# Patient Record
Sex: FEMALE | Race: WHITE | NOT HISPANIC OR LATINO | Employment: FULL TIME | URBAN - METROPOLITAN AREA
[De-identification: names, ages, dates, MRNs, and addresses within clinical notes are randomized per-mention and may not be internally consistent; named-entity substitution may affect disease eponyms.]

---

## 2017-01-16 ENCOUNTER — HOSPITAL ENCOUNTER (OUTPATIENT)
Dept: LAB | Facility: MEDICAL CENTER | Age: 22
End: 2017-01-16
Attending: PREVENTIVE MEDICINE
Payer: COMMERCIAL

## 2017-01-16 ENCOUNTER — EH NON-PROVIDER (OUTPATIENT)
Dept: OCCUPATIONAL MEDICINE | Facility: CLINIC | Age: 22
End: 2017-01-16

## 2017-01-16 ENCOUNTER — EMPLOYEE HEALTH (OUTPATIENT)
Dept: OCCUPATIONAL MEDICINE | Facility: CLINIC | Age: 22
End: 2017-01-16

## 2017-01-16 VITALS
WEIGHT: 195 LBS | TEMPERATURE: 98.8 F | HEIGHT: 66 IN | SYSTOLIC BLOOD PRESSURE: 124 MMHG | BODY MASS INDEX: 31.34 KG/M2 | HEART RATE: 92 BPM | DIASTOLIC BLOOD PRESSURE: 82 MMHG | OXYGEN SATURATION: 99 % | RESPIRATION RATE: 14 BRPM

## 2017-01-16 DIAGNOSIS — Z02.1 PHYSICAL EXAM, PRE-EMPLOYMENT: ICD-10-CM

## 2017-01-16 DIAGNOSIS — Z02.1 ENCOUNTER FOR PRE-EMPLOYMENT HEALTH SCREENING EXAMINATION: ICD-10-CM

## 2017-01-16 DIAGNOSIS — Z02.1 PRE-EMPLOYMENT DRUG SCREENING: Primary | ICD-10-CM

## 2017-01-16 LAB
AMP AMPHETAMINE: NORMAL
BAR BARBITURATES: NORMAL
BZO BENZODIAZEPINES: NORMAL
COC COCAINE: NORMAL
INT CON NEG: NORMAL
INT CON POS: NORMAL
MDMA ECSTASY: NORMAL
MET METHAMPHETAMINES: NORMAL
MTD METHADONE: NORMAL
OPI OPIATES: NORMAL
OXY OXYCODONE: NORMAL
PCP PHENCYCLIDINE: NORMAL
POC URINE DRUG SCREEN OCDRS: NORMAL
THC: NORMAL

## 2017-01-16 PROCEDURE — 94375 RESPIRATORY FLOW VOLUME LOOP: CPT | Performed by: PREVENTIVE MEDICINE

## 2017-01-16 PROCEDURE — 80305 DRUG TEST PRSMV DIR OPT OBS: CPT | Performed by: PREVENTIVE MEDICINE

## 2017-01-16 PROCEDURE — 99204 OFFICE O/P NEW MOD 45 MIN: CPT | Performed by: PREVENTIVE MEDICINE

## 2017-01-16 PROCEDURE — 86480 TB TEST CELL IMMUN MEASURE: CPT | Performed by: PREVENTIVE MEDICINE

## 2017-01-16 NOTE — MR AVS SNAPSHOT
"Dian Tinsley   2017 8:40 AM   Employee Health   MRN: [id number]    Department:  [facility]   Dept Phone:  [phone]    Description:  Female : 1995   Provider:  Kenneth Spann D.O.           Reason for Visit     Other [facility] physical      Allergies as of 2017     Allergen Noted Reactions    Gluten 2013   Diarrhea    Bloating/diarrhea    Lactose 2013   Diarrhea    Bloating/diarrhea    Pcn [Penicillins] 2009   Rash    rash    Sulfa Drugs 2009   Rash    rash      You were diagnosed with     Physical exam, pre-employment   [[id number]]         Vital Signs     Blood Pressure Pulse Temperature Respirations Height Weight    124/82 mmHg 92 37.1 °C (98.8 °F) 14 1.676 m (5' 5.98\") 88.451 kg (195 lb)    Body Mass Index Oxygen Saturation Smoking Status             31.49 kg/m2 99% Never Smoker          Basic Information     Date Of Birth Sex Race Ethnicity Preferred Language    1995 Female White Non- English      Problem List              ICD-10-CM Priority Class Noted - Resolved    Hypertrophy of tonsils with hypertrophy of adenoids J35.3   2013 - Present      Health Maintenance        Date Due Completion Dates    IMM HEP A VACCINE (1 of 2 - Standard Series) 1996 ---    IMM HPV VACCINE (1 of 3 - Female 3 Dose Series) 2006 ---    IMM VARICELLA (CHICKENPOX) VACCINE (1 of 2 - 2 Dose Adolescent Series) 2008 ---    IMM MENINGOCOCCAL VACCINE (MCV4) (1 of 1) 2011 ---    PAP SMEAR 2016 ---    IMM INFLUENZA (1) 2016 ---    IMM DTaP/Tdap/Td Vaccine (6 - Td) 2020, 1997, 4/15/1996, 1995, 1995            Current Immunizations     Dtap Vaccine 1997, 4/15/1996, 1995, 1995    HIB Vaccine (ACTHIB/HIBERIX) 1997, 4/15/1996, 1995, 1995    Hepatitis B Vaccine Non-Recombivax (Ped/Adol) 4/15/1996, 1995, 1995    IPV 4/15/1996, 1995, 1995    MMR Vaccine " 1/27/1997    Tdap Vaccine 2/1/2010    Tuberculin Skin Test 3/30/2016, 4/8/2015  3:01 PM, 4/25/2014  5:37 AM, 6/7/2013  4:10 PM, 5/29/2013  2:38 PM, 4/20/2012  4:00 PM, 4/13/2012  4:15 PM      Below and/or attached are the medications your provider expects you to take. Review all of your home medications and newly ordered medications with your provider and/or pharmacist. Follow medication instructions as directed by your provider and/or pharmacist. Please keep your medication list with you and share with your provider. Update the information when medications are discontinued, doses are changed, or new medications (including over-the-counter products) are added; and carry medication information at all times in the event of emergency situations     Allergies:  GLUTEN - Diarrhea     LACTOSE - Diarrhea     PCN - Rash     SULFA DRUGS - Rash               Medications  Valid as of: January 16, 2017 - 10:07 AM    Generic Name Brand Name Tablet Size Instructions for use    Azithromycin (Recon Susp) ZITHROMAX 200 MG/5ML 10CC  TODAY, THEN 5 CC ONCE A DAY FOR 4 DAYS        Ibuprofen (Tab) MOTRIN 200 MG Take 200 mg by mouth every 6 hours as needed. Indications: Mild to Moderate Pain        Oxycodone-Acetaminophen (Solution) ROXICET 5-325 MG/5ML Take 5-7.5 mL by mouth every four hours as needed (pain).        .                 Medicines prescribed today were sent to:     [id number] - [place], [place] - [address]    [address] [facility] [address]    Phone: [phone] Fax: [phone]    Open 24 Hours?: No      Medication refill instructions:       If your prescription bottle indicates you have medication refills left, it is not necessary to call your provider’s office. Please contact your pharmacy and they will refill your medication.    If your prescription bottle indicates you do not have any refills left, you may request refills at any time through one of the following ways: The online [employer] system (except Urgent Care), by  calling your provider’s office, or by asking your pharmacy to contact your provider’s office with a refill request. Medication refills are processed only during regular business hours and may not be available until the next business day. Your provider may request additional information or to have a follow-up visit with you prior to refilling your medication.   *Please Note: Medication refills are assigned a new Rx number when refilled electronically. Your pharmacy may indicate that no refills were authorized even though a new prescription for the same medication is available at the pharmacy. Please request the medicine by name with the pharmacy before contacting your provider for a refill.           ActionBase Access Code: [email or web address]  Expires: 2/15/2017  7:54 AM    ActionBase  A secure, online tool to manage your health information     [employer]’s ActionBase® is a secure, online tool that connects you to your personalized health information from the privacy of your home -- day or night - making it very easy for you to manage your healthcare. Once the activation process is completed, you can even access your medical information using the ActionBase goran, which is available for free in the Apple Goran store or Google Play store.     ActionBase provides the following levels of access (as shown below):   My Chart Features   [facility] Doctor [facility]  Specialists [facility]  Urgent  Care [facility]  Primary Care  Doctor   Email your healthcare team securely and privately 24/7 X X X    Manage appointments: schedule your next appointment; view details of past/upcoming appointments X      Request prescription refills. X      View recent personal medical records, including lab and immunizations X X X X   View health record, including health history, allergies, medications X X X X   Read reports about your outpatient visits, procedures, consult and ER notes X X X X   See your discharge summary, which is a recap of your hospital  and/or ER visit that includes your diagnosis, lab results, and care plan. X X       How to register for NetManage:  1. Go to  [email or web address].  2. Click on the Sign Up Now box, which takes you to the New Member Sign Up page. You will need to provide the following information:  a. Enter your NetManage Access Code exactly as it appears at the top of this page. (You will not need to use this code after you’ve completed the sign-up process. If you do not sign up before the expiration date, you must request a new code.)   b. Enter your date of birth.   c. Enter your home email address.   d. Click Submit, and follow the next screen’s instructions.  3. Create a CSIDt ID. This will be your NetManage login ID and cannot be changed, so think of one that is secure and easy to remember.  4. Create a CSIDt password. You can change your password at any time.  5. Enter your Password Reset Question and Answer. This can be used at a later time if you forget your password.   6. Enter your e-mail address. This allows you to receive e-mail notifications when new information is available in NetManage.  7. Click Sign Up. You can now view your health information.    For assistance activating your NetManage account, call [phone]

## 2017-01-18 LAB
M TB TUBERC IFN-G/MITOGEN IGNF BLD: 0.04
M TB TUBERC IGNF/MITOGEN IGNF CONTROL: 66.85 [IU]/ML
MITOGEN IGNF BCKGRD COR BLD-ACNC: 0.05 [IU]/ML
QUANT TB GOLD 86480: NEGATIVE

## 2017-09-20 ENCOUNTER — HOSPITAL ENCOUNTER (OUTPATIENT)
Dept: LAB | Facility: MEDICAL CENTER | Age: 22
End: 2017-09-20
Payer: COMMERCIAL

## 2017-09-20 LAB
BDY FAT % MEASURED: 32.3 %
BP DIAS: 98 MMHG
BP SYS: 139 MMHG
CHOLEST SERPL-MCNC: 175 MG/DL (ref 100–199)
DIABETES HTDIA: NO
EVENT NAME HTEVT: NORMAL
FASTING HTFAS: YES
GLUCOSE SERPL-MCNC: 80 MG/DL (ref 65–99)
HDLC SERPL-MCNC: 51 MG/DL
HYPERTENSION HTHYP: NO
LDLC SERPL CALC-MCNC: 109 MG/DL
SCREENING LOC CITY HTCIT: NORMAL
SCREENING LOC STATE HTSTA: NORMAL
SCREENING LOCATION HTLOC: NORMAL
SMOKING HTSMO: NO
SUBSCRIBER ID HTSID: NORMAL
TRIGL SERPL-MCNC: 77 MG/DL (ref 0–149)

## 2017-09-20 PROCEDURE — 80061 LIPID PANEL: CPT

## 2017-09-20 PROCEDURE — S5190 WELLNESS ASSESSMENT BY NONPH: HCPCS

## 2017-09-20 PROCEDURE — 82947 ASSAY GLUCOSE BLOOD QUANT: CPT

## 2017-09-20 PROCEDURE — 36415 COLL VENOUS BLD VENIPUNCTURE: CPT

## 2017-09-27 ENCOUNTER — EH NON-PROVIDER (OUTPATIENT)
Dept: OCCUPATIONAL MEDICINE | Facility: CLINIC | Age: 22
End: 2017-09-27

## 2017-09-27 DIAGNOSIS — Z29.89 NEED FOR ISOLATION: ICD-10-CM

## 2017-09-27 PROCEDURE — 94375 RESPIRATORY FLOW VOLUME LOOP: CPT

## 2017-11-09 ENCOUNTER — IMMUNIZATION (OUTPATIENT)
Dept: OCCUPATIONAL MEDICINE | Facility: CLINIC | Age: 22
End: 2017-11-09

## 2017-11-09 DIAGNOSIS — Z23 NEED FOR VACCINATION: ICD-10-CM

## 2017-11-09 PROCEDURE — 90686 IIV4 VACC NO PRSV 0.5 ML IM: CPT | Performed by: PREVENTIVE MEDICINE

## 2018-08-27 ENCOUNTER — DOCUMENTATION (OUTPATIENT)
Dept: OCCUPATIONAL MEDICINE | Facility: CLINIC | Age: 23
End: 2018-08-27

## 2018-09-14 ENCOUNTER — OFFICE VISIT (OUTPATIENT)
Dept: MEDICAL GROUP | Facility: MEDICAL CENTER | Age: 23
End: 2018-09-14
Payer: COMMERCIAL

## 2018-09-14 VITALS
OXYGEN SATURATION: 98 % | TEMPERATURE: 98.6 F | HEIGHT: 65 IN | WEIGHT: 216 LBS | DIASTOLIC BLOOD PRESSURE: 80 MMHG | RESPIRATION RATE: 16 BRPM | HEART RATE: 83 BPM | BODY MASS INDEX: 35.99 KG/M2 | SYSTOLIC BLOOD PRESSURE: 140 MMHG

## 2018-09-14 DIAGNOSIS — R53.83 FATIGUE, UNSPECIFIED TYPE: ICD-10-CM

## 2018-09-14 DIAGNOSIS — K90.41 GLUTEN INTOLERANCE: ICD-10-CM

## 2018-09-14 DIAGNOSIS — Z13.220 SCREENING CHOLESTEROL LEVEL: ICD-10-CM

## 2018-09-14 DIAGNOSIS — N92.6 IRREGULAR MENSES: ICD-10-CM

## 2018-09-14 DIAGNOSIS — R19.8 ALTERNATING CONSTIPATION AND DIARRHEA: ICD-10-CM

## 2018-09-14 DIAGNOSIS — E66.9 OBESITY (BMI 35.0-39.9 WITHOUT COMORBIDITY): ICD-10-CM

## 2018-09-14 DIAGNOSIS — R10.30 LOWER ABDOMINAL PAIN: ICD-10-CM

## 2018-09-14 PROBLEM — E74.39 GLUCOSE INTOLERANCE: Status: ACTIVE | Noted: 2018-09-14

## 2018-09-14 PROBLEM — E74.39 GLUCOSE INTOLERANCE: Status: RESOLVED | Noted: 2018-09-14 | Resolved: 2018-09-14

## 2018-09-14 PROCEDURE — 99203 OFFICE O/P NEW LOW 30 MIN: CPT | Performed by: NURSE PRACTITIONER

## 2018-09-14 ASSESSMENT — PATIENT HEALTH QUESTIONNAIRE - PHQ9: CLINICAL INTERPRETATION OF PHQ2 SCORE: 0

## 2018-09-14 ASSESSMENT — ENCOUNTER SYMPTOMS
CONSTIPATION: 1
ABDOMINAL PAIN: 1
DIARRHEA: 1

## 2018-09-14 NOTE — PROGRESS NOTES
Subjective:      Dian Tinsley is a 23 y.o. 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 She states she has not been to a medical provider in a few years.    HPI Dian Tinsley        1. Alternating constipation and diarrhea  Patient reports her previous PCP did some testing and found she was gluten intolerant but not with celiac disease. She states she avoids gluten and tries to watch her diet including avoidance of milk products because she is also lactose intolerant. She continues to have issues with her bowels despite this. She states she may go a few days with constipation and then have a few days of diarrhea. She has never had irritable bowel workup.    2. Gluten intolerance  As mentioned above, patient states she cannot eat gluten or milk products.    3. Fatigue, unspecified type  Patient reports she has had some issues with fatigue over the last few months and wonders if she may have some sort of hormonal imbalance. She would like to be tested for thyroid and does not feel she has sleep apnea or depression.    4. Irregular menses  It appears that patient had been having problems with her menstrual period for a few years and was on birth control pills in the past but did not tolerate them well. She also has obesity but denies hirsutism or excessive acne. She states she is a virgin.    5. Lower abdominal pain  Patient states she has mild lower abdominal discomfort when she has bloating and constipation but otherwise does not have any issues.    6. Obesity (BMI 35.0-39.9 without comorbidity)  Patient has issues with losing weight.      Social History   Substance Use Topics   • Smoking status: Never Smoker   • Smokeless tobacco: Never Used   • Alcohol use Yes      Comment: occasa     No current outpatient prescriptions  "on file.     No current facility-administered medications for this visit.      Past Medical History:   Diagnosis Date   • Bronchitis 2005   • Other specified disorder of intestines     Secondary to Gluten allergy     Family History   Problem Relation Age of Onset   • No Known Problems Mother    • Kidney Disease Father    • Arthritis Father    • Alcohol/Drug Father        Review of Systems   Constitutional: Positive for malaise/fatigue.   Gastrointestinal: Positive for abdominal pain, constipation and diarrhea.   All other systems reviewed and are negative.         Objective:     /80   Pulse 83   Temp 37 °C (98.6 °F)   Resp 16   Ht 1.651 m (5' 5\")   Wt 98 kg (216 lb)   SpO2 98%   BMI 35.94 kg/m²      Physical Exam   Constitutional: She is oriented to person, place, and time. She appears well-developed and well-nourished. No distress.   HENT:   Head: Normocephalic and atraumatic.   Right Ear: External ear normal.   Left Ear: External ear normal.   Nose: Nose normal.   Eyes: Right eye exhibits no discharge. Left eye exhibits no discharge.   Neck: Normal range of motion. Neck supple. No thyromegaly present.   Cardiovascular: Normal rate, regular rhythm and normal heart sounds.  Exam reveals no gallop and no friction rub.    No murmur heard.  Pulmonary/Chest: Effort normal and breath sounds normal. She has no wheezes. She has no rales.   Abdominal: Soft. Bowel sounds are normal. She exhibits no distension and no mass. There is no tenderness. There is no rebound and no guarding.   Musculoskeletal: She exhibits no edema or tenderness.   Neurological: She is alert and oriented to person, place, and time. She displays normal reflexes.   Skin: Skin is warm and dry. No rash noted. She is not diaphoretic.   Psychiatric: She has a normal mood and affect. Her behavior is normal. Judgment and thought content normal.   Nursing note and vitals reviewed.              Assessment/Plan:     1. Alternating constipation and " diarrhea  Patient's symptoms sound suspicious for possible irritable bowel so I will refer her to gastroenterology for further workup.   - REFERRAL TO GASTROENTEROLOGY    2. Gluten intolerance  Patient states she tries to avoid gluten products because she was told she had a gluten intolerance as well as lactose intolerance.    3. Fatigue, unspecified type  I will check for underlying issues such as diabetes, thyroid disease and anemia. She does not feel she has sleep apnea or depression.  - COMP METABOLIC PANEL; Future  - TSH; Future  - CBC WITHOUT DIFFERENTIAL; Future    4. Irregular menses  Patient reports problems with irregular menses and she has some hormonal issues so she will be referred to gynecology. She states she has never been sexually active.  - REFERRAL TO GYNECOLOGY    5. Lower abdominal pain  I question possible polycystic ovarian disease with her weight and her regular periods. I will check her thyroid as well as for diabetes.  - US-GYN-PELVIS TRANSVAGINAL; Future    6. Obesity (BMI 35.0-39.9 without comorbidity)    - Patient identified as having weight management issue.  Appropriate orders and counseling given.    7. Influenza vaccine needed  Patient left before receiving vaccine.      8. Screening cholesterol level    - LIPID PROFILE; Future

## 2018-09-18 ENCOUNTER — HOSPITAL ENCOUNTER (OUTPATIENT)
Dept: LAB | Facility: MEDICAL CENTER | Age: 23
End: 2018-09-18
Attending: NURSE PRACTITIONER
Payer: COMMERCIAL

## 2018-09-18 ENCOUNTER — HOSPITAL ENCOUNTER (OUTPATIENT)
Dept: RADIOLOGY | Facility: MEDICAL CENTER | Age: 23
End: 2018-09-18
Attending: NURSE PRACTITIONER
Payer: COMMERCIAL

## 2018-09-18 ENCOUNTER — HOSPITAL ENCOUNTER (OUTPATIENT)
Dept: LAB | Facility: MEDICAL CENTER | Age: 23
End: 2018-09-18
Payer: COMMERCIAL

## 2018-09-18 DIAGNOSIS — Z13.220 SCREENING CHOLESTEROL LEVEL: ICD-10-CM

## 2018-09-18 DIAGNOSIS — R10.30 LOWER ABDOMINAL PAIN: ICD-10-CM

## 2018-09-18 DIAGNOSIS — R53.83 FATIGUE, UNSPECIFIED TYPE: ICD-10-CM

## 2018-09-18 LAB
ALBUMIN SERPL BCP-MCNC: 4.6 G/DL (ref 3.2–4.9)
ALBUMIN/GLOB SERPL: 1.6 G/DL
ALP SERPL-CCNC: 80 U/L (ref 30–99)
ALT SERPL-CCNC: 13 U/L (ref 2–50)
ANION GAP SERPL CALC-SCNC: 6 MMOL/L (ref 0–11.9)
AST SERPL-CCNC: 17 U/L (ref 12–45)
BDY FAT % MEASURED: 36.9 %
BILIRUB SERPL-MCNC: 0.7 MG/DL (ref 0.1–1.5)
BP DIAS: 66 MMHG
BP SYS: 136 MMHG
BUN SERPL-MCNC: 11 MG/DL (ref 8–22)
CALCIUM SERPL-MCNC: 9.9 MG/DL (ref 8.5–10.5)
CHLORIDE SERPL-SCNC: 105 MMOL/L (ref 96–112)
CHOLEST SERPL-MCNC: 164 MG/DL (ref 100–199)
CHOLEST SERPL-MCNC: 166 MG/DL (ref 100–199)
CO2 SERPL-SCNC: 28 MMOL/L (ref 20–33)
CREAT SERPL-MCNC: 0.73 MG/DL (ref 0.5–1.4)
DIABETES HTDIA: NO
ERYTHROCYTE [DISTWIDTH] IN BLOOD BY AUTOMATED COUNT: 37.9 FL (ref 35.9–50)
EVENT NAME HTEVT: NORMAL
FASTING HTFAS: YES
FASTING STATUS PATIENT QL REPORTED: NORMAL
GLOBULIN SER CALC-MCNC: 2.8 G/DL (ref 1.9–3.5)
GLUCOSE SERPL-MCNC: 84 MG/DL (ref 65–99)
GLUCOSE SERPL-MCNC: 84 MG/DL (ref 65–99)
HCT VFR BLD AUTO: 38.6 % (ref 37–47)
HDLC SERPL-MCNC: 43 MG/DL
HDLC SERPL-MCNC: 44 MG/DL
HGB BLD-MCNC: 12.4 G/DL (ref 12–16)
HYPERTENSION HTHYP: NO
LDLC SERPL CALC-MCNC: 102 MG/DL
LDLC SERPL CALC-MCNC: 104 MG/DL
MCH RBC QN AUTO: 26.8 PG (ref 27–33)
MCHC RBC AUTO-ENTMCNC: 32.1 G/DL (ref 33.6–35)
MCV RBC AUTO: 83.4 FL (ref 81.4–97.8)
PLATELET # BLD AUTO: 348 K/UL (ref 164–446)
PMV BLD AUTO: 10.7 FL (ref 9–12.9)
POTASSIUM SERPL-SCNC: 4.1 MMOL/L (ref 3.6–5.5)
PROT SERPL-MCNC: 7.4 G/DL (ref 6–8.2)
RBC # BLD AUTO: 4.63 M/UL (ref 4.2–5.4)
SCREENING LOC CITY HTCIT: NORMAL
SCREENING LOC STATE HTSTA: NORMAL
SCREENING LOCATION HTLOC: NORMAL
SMOKING HTSMO: NO
SODIUM SERPL-SCNC: 139 MMOL/L (ref 135–145)
SUBSCRIBER ID HTSID: NORMAL
TRIGL SERPL-MCNC: 92 MG/DL (ref 0–149)
TRIGL SERPL-MCNC: 94 MG/DL (ref 0–149)
TSH SERPL DL<=0.005 MIU/L-ACNC: 2.37 UIU/ML (ref 0.38–5.33)
WBC # BLD AUTO: 7 K/UL (ref 4.8–10.8)

## 2018-09-18 PROCEDURE — 80053 COMPREHEN METABOLIC PANEL: CPT

## 2018-09-18 PROCEDURE — 36415 COLL VENOUS BLD VENIPUNCTURE: CPT

## 2018-09-18 PROCEDURE — 80061 LIPID PANEL: CPT | Mod: 91

## 2018-09-18 PROCEDURE — 84443 ASSAY THYROID STIM HORMONE: CPT

## 2018-09-18 PROCEDURE — 82947 ASSAY GLUCOSE BLOOD QUANT: CPT

## 2018-09-18 PROCEDURE — 76830 TRANSVAGINAL US NON-OB: CPT

## 2018-09-18 PROCEDURE — S5190 WELLNESS ASSESSMENT BY NONPH: HCPCS

## 2018-09-18 PROCEDURE — 85027 COMPLETE CBC AUTOMATED: CPT

## 2018-09-18 PROCEDURE — 80061 LIPID PANEL: CPT

## 2018-09-21 ENCOUNTER — EH NON-PROVIDER (OUTPATIENT)
Dept: OCCUPATIONAL MEDICINE | Facility: CLINIC | Age: 23
End: 2018-09-21

## 2018-09-21 DIAGNOSIS — Z02.89 ENCOUNTER FOR OCCUPATIONAL HEALTH EXAMINATION INVOLVING RESPIRATOR: Primary | ICD-10-CM

## 2018-09-21 PROCEDURE — 94375 RESPIRATORY FLOW VOLUME LOOP: CPT | Performed by: PREVENTIVE MEDICINE

## 2018-09-26 ENCOUNTER — IMMUNIZATION (OUTPATIENT)
Dept: OCCUPATIONAL MEDICINE | Facility: CLINIC | Age: 23
End: 2018-09-26

## 2018-09-26 DIAGNOSIS — Z23 NEED FOR VACCINATION: ICD-10-CM

## 2018-10-05 PROCEDURE — 90686 IIV4 VACC NO PRSV 0.5 ML IM: CPT | Performed by: PREVENTIVE MEDICINE

## 2019-01-12 ENCOUNTER — OFFICE VISIT (OUTPATIENT)
Dept: URGENT CARE | Facility: MEDICAL CENTER | Age: 24
End: 2019-01-12
Payer: COMMERCIAL

## 2019-01-12 VITALS
OXYGEN SATURATION: 95 % | SYSTOLIC BLOOD PRESSURE: 130 MMHG | TEMPERATURE: 98.7 F | HEART RATE: 95 BPM | DIASTOLIC BLOOD PRESSURE: 86 MMHG | HEIGHT: 65 IN | BODY MASS INDEX: 36.89 KG/M2 | WEIGHT: 221.4 LBS

## 2019-01-12 DIAGNOSIS — R11.10 VOMITING, INTRACTABILITY OF VOMITING NOT SPECIFIED, PRESENCE OF NAUSEA NOT SPECIFIED, UNSPECIFIED VOMITING TYPE: ICD-10-CM

## 2019-01-12 PROCEDURE — 99214 OFFICE O/P EST MOD 30 MIN: CPT | Performed by: NURSE PRACTITIONER

## 2019-01-12 RX ORDER — ONDANSETRON 2 MG/ML
4 INJECTION INTRAMUSCULAR; INTRAVENOUS ONCE
Status: COMPLETED | OUTPATIENT
Start: 2019-01-12 | End: 2019-01-12

## 2019-01-12 RX ORDER — ONDANSETRON 4 MG/1
4 TABLET, FILM COATED ORAL EVERY 4 HOURS PRN
Qty: 20 TAB | Refills: 0 | Status: SHIPPED | OUTPATIENT
Start: 2019-01-12 | End: 2019-04-21

## 2019-01-12 RX ADMIN — ONDANSETRON 4 MG: 2 INJECTION INTRAMUSCULAR; INTRAVENOUS at 17:01

## 2019-01-12 ASSESSMENT — ENCOUNTER SYMPTOMS
VOMITING: 1
FEVER: 0
CHILLS: 0
ABDOMINAL PAIN: 0
NUMBER OF EPISODES OF EMESIS TODAY: 1
DIARRHEA: 0
NAUSEA: 1

## 2019-01-13 NOTE — PROGRESS NOTES
"Subjective:      Dian Tinsley is a 23 y.o. female who presents with Emesis (fever/ chills/ nausea X1 day)     Past Medical History:   Diagnosis Date   • Bronchitis 2005   • Other specified disorder of intestines     Secondary to Gluten allergy     Social History     Social History   • Marital status: Single     Spouse name: N/A   • Number of children: N/A   • Years of education: N/A     Occupational History   • Not on file.     Social History Main Topics   • Smoking status: Never Smoker   • Smokeless tobacco: Never Used   • Alcohol use No      Comment: occasa   • Drug use: No   • Sexual activity: No     Other Topics Concern   • Not on file     Social History Narrative   • No narrative on file     Family History   Problem Relation Age of Onset   • No Known Problems Mother    • Kidney Disease Father    • Arthritis Father    • Alcohol/Drug Father        Allergies: Gluten; Lactose; Pcn [penicillins]; Sulfa drugs; and Latex    Patient is a 23-year-old female who presents today with complaint of nausea and vomiting.  Symptoms started yesterday.  She denies any abdominal pain acutely or diarrhea.  She has had general malaise but no known fever.            Emesis   This is a new problem. The current episode started yesterday. The problem occurs intermittently. The problem has been unchanged. Associated symptoms include nausea and vomiting. Pertinent negatives include no abdominal pain, chills or fever. Associated symptoms comments: No diarrhea. Nothing aggravates the symptoms. She has tried nothing for the symptoms. The treatment provided no relief.       Review of Systems   Constitutional: Positive for malaise/fatigue. Negative for chills and fever.   Gastrointestinal: Positive for nausea and vomiting. Negative for abdominal pain and diarrhea.   All other systems reviewed and are negative.         Objective:     /86   Pulse 95   Temp 37.1 °C (98.7 °F) (Temporal)   Ht 1.651 m (5' 5\")   Wt 100.4 kg (221 lb " 6.4 oz)   LMP 01/09/2019   SpO2 95%   BMI 36.84 kg/m²      Physical Exam   Constitutional: She is oriented to person, place, and time. She appears well-developed and well-nourished.   Eyes: Pupils are equal, round, and reactive to light. Conjunctivae and EOM are normal.   Neck: Normal range of motion. Neck supple.   Cardiovascular: Normal rate and regular rhythm.    Pulmonary/Chest: Effort normal and breath sounds normal.   Abdominal: She exhibits no distension. There is tenderness. There is no guarding.   Mild generalized tenderness without guarding or rebound tenderness.   Musculoskeletal: Normal range of motion.   Neurological: She is alert and oriented to person, place, and time.   Skin: Skin is warm and dry. Capillary refill takes less than 2 seconds.   Psychiatric: She has a normal mood and affect. Her behavior is normal. Judgment and thought content normal.   Vitals reviewed.    Patient states that she has never been sexually active and is currently on her period.          Assessment/Plan:     1. Vomiting, intractability of vomiting not specified, presence of nausea not specified, unspecified vomiting type    - ondansetron (ZOFRAN) syringe/vial injection 4 mg; 2 mL by Intramuscular route Once.  - ondansetron (ZOFRAN) 4 MG Tab tablet; Take 1 Tab by mouth every four hours as needed for Nausea/Vomiting.  Dispense: 20 Tab; Refill: 0

## 2019-01-21 ENCOUNTER — OFFICE VISIT (OUTPATIENT)
Dept: URGENT CARE | Facility: CLINIC | Age: 24
End: 2019-01-21
Payer: COMMERCIAL

## 2019-01-21 VITALS
TEMPERATURE: 97.9 F | DIASTOLIC BLOOD PRESSURE: 90 MMHG | HEIGHT: 66 IN | WEIGHT: 215 LBS | RESPIRATION RATE: 17 BRPM | HEART RATE: 79 BPM | OXYGEN SATURATION: 97 % | BODY MASS INDEX: 34.55 KG/M2 | SYSTOLIC BLOOD PRESSURE: 130 MMHG

## 2019-01-21 DIAGNOSIS — R11.2 NAUSEA AND VOMITING, INTRACTABILITY OF VOMITING NOT SPECIFIED, UNSPECIFIED VOMITING TYPE: ICD-10-CM

## 2019-01-21 LAB
APPEARANCE UR: CLEAR
BILIRUB UR STRIP-MCNC: NEGATIVE MG/DL
COLOR UR AUTO: YELLOW
FLUAV+FLUBV AG SPEC QL IA: NEGATIVE
GLUCOSE UR STRIP.AUTO-MCNC: NEGATIVE MG/DL
INT CON NEG: NEGATIVE
INT CON NEG: NORMAL
INT CON POS: NORMAL
INT CON POS: POSITIVE
KETONES UR STRIP.AUTO-MCNC: NEGATIVE MG/DL
LEUKOCYTE ESTERASE UR QL STRIP.AUTO: NORMAL
NITRITE UR QL STRIP.AUTO: NEGATIVE
PH UR STRIP.AUTO: 7.5 [PH] (ref 5–8)
POC URINE PREGNANCY TEST: NEGATIVE
PROT UR QL STRIP: NEGATIVE MG/DL
RBC UR QL AUTO: NEGATIVE
SP GR UR STRIP.AUTO: 1.02
UROBILINOGEN UR STRIP-MCNC: 1 MG/DL

## 2019-01-21 PROCEDURE — 81025 URINE PREGNANCY TEST: CPT | Performed by: PHYSICIAN ASSISTANT

## 2019-01-21 PROCEDURE — 99214 OFFICE O/P EST MOD 30 MIN: CPT | Performed by: PHYSICIAN ASSISTANT

## 2019-01-21 PROCEDURE — 81002 URINALYSIS NONAUTO W/O SCOPE: CPT | Performed by: PHYSICIAN ASSISTANT

## 2019-01-21 PROCEDURE — 87804 INFLUENZA ASSAY W/OPTIC: CPT | Performed by: PHYSICIAN ASSISTANT

## 2019-01-21 RX ORDER — PROMETHAZINE HYDROCHLORIDE 25 MG/1
25 TABLET ORAL EVERY 6 HOURS PRN
Qty: 30 TAB | Refills: 0 | Status: SHIPPED | OUTPATIENT
Start: 2019-01-21 | End: 2019-04-21

## 2019-01-23 ASSESSMENT — ENCOUNTER SYMPTOMS
NUMBER OF EPISODES OF EMESIS TODAY: 1
FATIGUE: 0
SHORTNESS OF BREATH: 0
FEVER: 0
COUGH: 0
BLOOD IN STOOL: 0
DIARRHEA: 0
NAUSEA: 1
MUSCULOSKELETAL NEGATIVE: 1
FLANK PAIN: 0
ABDOMINAL PAIN: 0
DIZZINESS: 0
CONSTIPATION: 0
CHILLS: 0
VOMITING: 1

## 2019-01-23 NOTE — PROGRESS NOTES
"Subjective:      Dian Tinsley is a 23 y.o. female who presents with Emesis (nausea, taking zofran po without relief, not working, missing work, constant nausea, dizzy, light head) and Letter for School/Work            Emesis   This is a new problem. The current episode started 1 to 4 weeks ago (9 days). The problem occurs intermittently. The problem has been waxing and waning. Associated symptoms include nausea and vomiting. Pertinent negatives include no abdominal pain, chest pain, chills, congestion, coughing, fatigue, fever or rash. The symptoms are aggravated by eating and drinking. Treatments tried: zofran. The treatment provided mild relief.     Patient was seen in urgent care 9 days ago for the same problem. She reports intermittent nausea and vomiting since that time. Symptoms have improved but haven't resolved. She has been taking zofran without significant relief. No fevers, chills, body aches, abdominal pain, constipation, diarrhea, or urinary symptoms. She did recently return from a trip to [place], but denies any concern for suspect food intake. None of her companions are currently experiencing similar symptoms.     Review of Systems   Constitutional: Negative for chills, fatigue and fever.   HENT: Negative for congestion.    Respiratory: Negative for cough and shortness of breath.    Cardiovascular: Negative for chest pain.   Gastrointestinal: Positive for nausea and vomiting. Negative for abdominal pain, blood in stool, constipation and diarrhea.   Genitourinary: Negative for dysuria, flank pain, frequency, hematuria and urgency.   Musculoskeletal: Negative.    Skin: Negative for rash.   Neurological: Negative for dizziness.        Objective:     /90 (BP Location: Left arm, Patient Position: Sitting, BP Cuff Size: Adult)   Pulse 79   Temp 36.6 °C (97.9 °F) (Temporal)   Resp 17   Ht 1.676 m (5' 6\")   Wt 97.5 kg (215 lb)   LMP 01/09/2019   SpO2 97%   BMI 34.70 kg/m²      Physical " Exam   Constitutional: She is oriented to person, place, and time. She appears well-developed and well-nourished. No distress.   HENT:   Head: Normocephalic and atraumatic.   Eyes: Pupils are equal, round, and reactive to light.   Neck: Normal range of motion.   Cardiovascular: Normal rate.    Pulmonary/Chest: Effort normal.   Abdominal: Soft. Bowel sounds are normal. She exhibits no distension. There is no tenderness. There is no guarding.   No CVAT bilaterally   Musculoskeletal: Normal range of motion.   Neurological: She is alert and oriented to person, place, and time.   Skin: Skin is warm and dry. She is not diaphoretic.   Psychiatric: She has a normal mood and affect. Her behavior is normal.   Nursing note and vitals reviewed.         PMH:  has a past medical history of Bronchitis (2005) and Other specified disorder of intestines. She also has no past medical history of Pain.  MEDS:   Current Outpatient Prescriptions:   •  promethazine (PHENERGAN) 25 MG Tab, Take 1 Tab by mouth every 6 hours as needed for Nausea/Vomiting., Disp: 30 Tab, Rfl: 0  •  ondansetron (ZOFRAN) 4 MG Tab tablet, Take 1 Tab by mouth every four hours as needed for Nausea/Vomiting., Disp: 20 Tab, Rfl: 0  ALLERGIES:   Allergies   Allergen Reactions   • Gluten Diarrhea     Bloating/diarrhea   • Lactose Diarrhea     Bloating/diarrhea   • Pcn [Penicillins] Rash     rash   • Sulfa Drugs Rash     rash   • Latex      SURGHX:   Past Surgical History:   Procedure Laterality Date   • TONSILLECTOMY  12/13/2013    Performed by Jose Mackey M.D. at SURGERY [facility]   • OTHER  2012    wisdom teeth extraction   • OTHER  1997    Sinus opening from ear into chest-repaired     SOCHX:  reports that she has never smoked. She has never used smokeless tobacco. She reports that she does not drink alcohol or use drugs.  FH: family history includes Alcohol/Drug in her father; Arthritis in her father; Kidney Disease in her father; No Known Problems in her  mother.     POCT Urinalysis:   Component Results     Component Value Ref Range & Units Status   POC Color YELLOW  Negative Final   POC Appearance CLEAR  Negative Final   POC Leukocyte Esterase SMALL  Negative Final   POC Nitrites NEGATIVE  Negative Final   POC Urobiligen 1.0  Negative (0.2) mg/dL Final   POC Protein NEGATIVE  Negative mg/dL Final   POC Urine PH 7.5  5.0 - 8.0 Final   POC Blood NEGATIVE  Negative Final   POC Specific Gravity 1.020  <1.005 - >1.030 Final   POC Ketones NEGATIVE  Negative mg/dL Final   POC Bilirubin NEGATIVE  Negative mg/dL Final   POC Glucose NEGATIVE  Negative mg/dL Final   Last Resulted Time   Mon Jan 21, 2019  5:46 PM       Assessment/Plan:     1. Nausea and vomiting, intractability of vomiting not specified, unspecified vomiting type  - POCT Urinalysis --> normal  - POCT Pregnancy --> negative  - POCT Influenza A/B --> negative  - promethazine (PHENERGAN) 25 MG Tab; Take 1 Tab by mouth every 6 hours as needed for Nausea/Vomiting.  Dispense: 30 Tab; Refill: 0    Trial of phenergan provided today. Encouraged increased fluids and BRAT diet discussed. She has a referral to GI already and states she will follow up if needed if symptoms persist/worsen. Work note provided today. The patient demonstrated a good understanding and agreed with the treatment plan.

## 2019-04-21 ENCOUNTER — HOSPITAL ENCOUNTER (OUTPATIENT)
Facility: MEDICAL CENTER | Age: 24
End: 2019-04-21
Payer: COMMERCIAL

## 2019-04-21 ENCOUNTER — HOSPITAL ENCOUNTER (INPATIENT)
Facility: MEDICAL CENTER | Age: 24
LOS: 1 days | DRG: 918 | End: 2019-04-22
Attending: EMERGENCY MEDICINE | Admitting: FAMILY MEDICINE
Payer: COMMERCIAL

## 2019-04-21 DIAGNOSIS — T50.901A ACCIDENTAL DRUG OVERDOSE, INITIAL ENCOUNTER: ICD-10-CM

## 2019-04-21 PROBLEM — T39.1X1A ACCIDENTAL ACETAMINOPHEN OVERDOSE: Status: ACTIVE | Noted: 2019-04-21

## 2019-04-21 PROBLEM — F10.90 ALCOHOL USE: Status: ACTIVE | Noted: 2019-04-21

## 2019-04-21 PROBLEM — T39.311A: Status: ACTIVE | Noted: 2019-04-21

## 2019-04-21 PROBLEM — Z78.9 ALCOHOL USE: Status: ACTIVE | Noted: 2019-04-21

## 2019-04-21 LAB
ALBUMIN SERPL BCP-MCNC: 3.9 G/DL (ref 3.2–4.9)
ALBUMIN/GLOB SERPL: 1.6 G/DL
ALP SERPL-CCNC: 52 U/L (ref 30–99)
ALT SERPL-CCNC: 14 U/L (ref 2–50)
ANION GAP SERPL CALC-SCNC: 11 MMOL/L (ref 0–11.9)
APAP SERPL-MCNC: <10 UG/ML (ref 10–30)
AST SERPL-CCNC: 18 U/L (ref 12–45)
BILIRUB SERPL-MCNC: 0.8 MG/DL (ref 0.1–1.5)
BUN SERPL-MCNC: 6 MG/DL (ref 8–22)
CALCIUM SERPL-MCNC: 9.2 MG/DL (ref 8.5–10.5)
CHLORIDE SERPL-SCNC: 106 MMOL/L (ref 96–112)
CO2 SERPL-SCNC: 21 MMOL/L (ref 20–33)
CREAT SERPL-MCNC: 0.59 MG/DL (ref 0.5–1.4)
GLOBULIN SER CALC-MCNC: 2.4 G/DL (ref 1.9–3.5)
GLUCOSE SERPL-MCNC: 87 MG/DL (ref 65–99)
MAGNESIUM SERPL-MCNC: 1.8 MG/DL (ref 1.5–2.5)
PHOSPHATE SERPL-MCNC: 3.7 MG/DL (ref 2.5–4.5)
POTASSIUM SERPL-SCNC: 3.8 MMOL/L (ref 3.6–5.5)
PROT SERPL-MCNC: 6.3 G/DL (ref 6–8.2)
SODIUM SERPL-SCNC: 138 MMOL/L (ref 135–145)

## 2019-04-21 PROCEDURE — 700111 HCHG RX REV CODE 636 W/ 250 OVERRIDE (IP): Performed by: FAMILY MEDICINE

## 2019-04-21 PROCEDURE — 96375 TX/PRO/DX INJ NEW DRUG ADDON: CPT

## 2019-04-21 PROCEDURE — 83735 ASSAY OF MAGNESIUM: CPT

## 2019-04-21 PROCEDURE — 80053 COMPREHEN METABOLIC PANEL: CPT

## 2019-04-21 PROCEDURE — 700101 HCHG RX REV CODE 250: Performed by: FAMILY MEDICINE

## 2019-04-21 PROCEDURE — 99223 1ST HOSP IP/OBS HIGH 75: CPT | Performed by: FAMILY MEDICINE

## 2019-04-21 PROCEDURE — 700105 HCHG RX REV CODE 258: Performed by: FAMILY MEDICINE

## 2019-04-21 PROCEDURE — 36415 COLL VENOUS BLD VENIPUNCTURE: CPT

## 2019-04-21 PROCEDURE — 80307 DRUG TEST PRSMV CHEM ANLYZR: CPT

## 2019-04-21 PROCEDURE — 770020 HCHG ROOM/CARE - TELE (206)

## 2019-04-21 PROCEDURE — 99285 EMERGENCY DEPT VISIT HI MDM: CPT

## 2019-04-21 PROCEDURE — 96365 THER/PROPH/DIAG IV INF INIT: CPT

## 2019-04-21 PROCEDURE — 84100 ASSAY OF PHOSPHORUS: CPT

## 2019-04-21 RX ORDER — POLYETHYLENE GLYCOL 3350 17 G/17G
1 POWDER, FOR SOLUTION ORAL
Status: DISCONTINUED | OUTPATIENT
Start: 2019-04-21 | End: 2019-04-22 | Stop reason: HOSPADM

## 2019-04-21 RX ORDER — ONDANSETRON 2 MG/ML
4 INJECTION INTRAMUSCULAR; INTRAVENOUS EVERY 4 HOURS PRN
Status: DISCONTINUED | OUTPATIENT
Start: 2019-04-21 | End: 2019-04-22 | Stop reason: HOSPADM

## 2019-04-21 RX ORDER — PROMETHAZINE HYDROCHLORIDE 25 MG/1
12.5-25 SUPPOSITORY RECTAL EVERY 4 HOURS PRN
Status: DISCONTINUED | OUTPATIENT
Start: 2019-04-21 | End: 2019-04-22 | Stop reason: HOSPADM

## 2019-04-21 RX ORDER — PROMETHAZINE HYDROCHLORIDE 25 MG/1
12.5-25 TABLET ORAL EVERY 4 HOURS PRN
Status: DISCONTINUED | OUTPATIENT
Start: 2019-04-21 | End: 2019-04-22 | Stop reason: HOSPADM

## 2019-04-21 RX ORDER — SODIUM CHLORIDE AND POTASSIUM CHLORIDE 150; 900 MG/100ML; MG/100ML
INJECTION, SOLUTION INTRAVENOUS CONTINUOUS
Status: DISCONTINUED | OUTPATIENT
Start: 2019-04-21 | End: 2019-04-22 | Stop reason: HOSPADM

## 2019-04-21 RX ORDER — BISACODYL 10 MG
10 SUPPOSITORY, RECTAL RECTAL
Status: DISCONTINUED | OUTPATIENT
Start: 2019-04-21 | End: 2019-04-22 | Stop reason: HOSPADM

## 2019-04-21 RX ORDER — ONDANSETRON 4 MG/1
4 TABLET, ORALLY DISINTEGRATING ORAL EVERY 4 HOURS PRN
Status: DISCONTINUED | OUTPATIENT
Start: 2019-04-21 | End: 2019-04-22 | Stop reason: HOSPADM

## 2019-04-21 RX ORDER — AMOXICILLIN 250 MG
2 CAPSULE ORAL 2 TIMES DAILY
Status: DISCONTINUED | OUTPATIENT
Start: 2019-04-22 | End: 2019-04-22

## 2019-04-21 RX ADMIN — PROCHLORPERAZINE EDISYLATE 10 MG: 5 INJECTION INTRAMUSCULAR; INTRAVENOUS at 20:14

## 2019-04-21 RX ADMIN — POTASSIUM CHLORIDE AND SODIUM CHLORIDE: 900; 150 INJECTION, SOLUTION INTRAVENOUS at 20:14

## 2019-04-21 RX ADMIN — ACETYLCYSTEINE 4900 MG: 200 SOLUTION ORAL; RESPIRATORY (INHALATION) at 16:12

## 2019-04-21 RX ADMIN — PROCHLORPERAZINE EDISYLATE 10 MG: 5 INJECTION INTRAMUSCULAR; INTRAVENOUS at 16:09

## 2019-04-21 RX ADMIN — ACETYLCYSTEINE 9780 MG: 200 SOLUTION ORAL; RESPIRATORY (INHALATION) at 22:13

## 2019-04-21 ASSESSMENT — ENCOUNTER SYMPTOMS
DIZZINESS: 0
FOCAL WEAKNESS: 0
CHILLS: 0
SPEECH CHANGE: 0
SENSORY CHANGE: 0
ABDOMINAL PAIN: 0
NERVOUS/ANXIOUS: 0
BLURRED VISION: 0
WEAKNESS: 0
SHORTNESS OF BREATH: 0
HEADACHES: 0
NAUSEA: 1
NECK PAIN: 0
PALPITATIONS: 0
WHEEZING: 0
SORE THROAT: 0
BACK PAIN: 0
DIARRHEA: 0
FEVER: 0
HEARTBURN: 0
COUGH: 0
VOMITING: 0

## 2019-04-21 ASSESSMENT — COGNITIVE AND FUNCTIONAL STATUS - GENERAL
SUGGESTED CMS G CODE MODIFIER MOBILITY: CH
MOBILITY SCORE: 24
DAILY ACTIVITIY SCORE: 24
SUGGESTED CMS G CODE MODIFIER DAILY ACTIVITY: CH

## 2019-04-21 ASSESSMENT — COPD QUESTIONNAIRES
DO YOU EVER COUGH UP ANY MUCUS OR PHLEGM?: NO/ONLY WITH OCCASIONAL COLDS OR INFECTIONS
IN THE PAST 12 MONTHS DO YOU DO LESS THAN YOU USED TO BECAUSE OF YOUR BREATHING PROBLEMS: DISAGREE/UNSURE
HAVE YOU SMOKED AT LEAST 100 CIGARETTES IN YOUR ENTIRE LIFE: NO/DON'T KNOW
DURING THE PAST 4 WEEKS HOW MUCH DID YOU FEEL SHORT OF BREATH: NONE/LITTLE OF THE TIME

## 2019-04-21 ASSESSMENT — LIFESTYLE VARIABLES
ALCOHOL_USE: NO
EVER_SMOKED: NEVER

## 2019-04-21 NOTE — ED TRIAGE NOTES
.  Chief Complaint   Patient presents with   • Drug Overdose     pt transferred from [facility] due to insurance coverage; has a 20g RAC in place upon arrival; pt states she accidentally took 24 500 mg tylenol, 24 200 mg ibuprofen, was given loading dose of antidote at [facility] and sent here for second dose     Patient ambulatory to triage for above complaints; A&O X4; NAD observed; Charge RN aware; pt accidentally took medications when she had drank too much alcohol; denies SI/HI.   Patient placed in lobby and educated to notify staff of new or worsening symptoms.

## 2019-04-21 NOTE — ED NOTES
1545  Apologized to pts mom for miscommunication.  Pt mom still agitated, reporting she wants to keep certain information about daughter private and handle it on her own.      Spoke with pt directly who was appreciative of my actions.  POC reviewed and explained to both pt and mother.

## 2019-04-21 NOTE — ED PROVIDER NOTES
ED Provider Note    Scribed for Peyton Burton M.D. by Goran Chavez. 4/21/2019, 2:26 PM.    Primary care provider: KARTIK Nagel  Means of arrival: Walk in  History obtained from: Patient  History limited by: None    CHIEF COMPLAINT  Chief Complaint   Patient presents with   • Drug Overdose     pt transferred from [facility] due to insurance coverage; has a 20g RAC in place upon arrival; pt states she accidentally took 24 500 mg tylenol, 24 200 mg ibuprofen, was given loading dose of antidote at [facility] and sent here for second dose       HPI  [facility] ERJustice (Dian Tinsley) is a 23 y.o. female who presents to the Emergency Department for evaluation following an accidental drug overdose. The patient states she was intoxicated last night when she developed a headache, therefore she began to taken Tylenol around 1 AM last night to treat her headache. Upon waking up this morning, the patient realized she had accidentally taken 24 tablets of 500 mg Tylenol and 24 tables of 200 mg Ibuprofen while intoxicated. The patient immediately sought out medical attention at [facility]. Lab work revealed an elevated Tylenol level of 40 at that time. She received a loading dose of N-acetylcysteine at [facility], but was subsequently transferred here secondary to insurance coverage issues. The patient reports associated mild nausea and vomiting after receiving the medication, but denies any abdominal pain. No alleviating or exacerbating factors are identified at this time. She reports no suicidal ideations or homicidal ideations.     REVIEW OF SYSTEMS  Pertinent positives include nausea and vomiting. Pertinent negatives include no abdominal pain.  All other systems reviewed and negative.    PAST MEDICAL HISTORY   has a past medical history of Bronchitis (2005) and Other specified disorder of intestines.    SURGICAL HISTORY   has a past surgical history that includes other (1997); other (2012); and  "tonsillectomy (12/13/2013).    SOCIAL HISTORY  Social History   Substance Use Topics   • Smoking status: Never Smoker   • Smokeless tobacco: Never Used   • Alcohol use Yes      Comment: rare      History   Drug Use No       FAMILY HISTORY  Family History   Problem Relation Age of Onset   • No Known Problems Mother    • Kidney Disease Father    • Arthritis Father    • Alcohol/Drug Father        CURRENT MEDICATIONS  Home Medications     Reviewed by Maria Del Rosario Pedroza, Pharmacy Intern (Pharmacy Intern) on 04/21/19 at 1533  Med List Status: Complete   Medication Last Dose Status        Patient Dane Taking any Medications                       ALLERGIES  Allergies   Allergen Reactions   • Gluten Diarrhea     Bloating/diarrhea   • Lactose Diarrhea     Bloating/diarrhea   • Pcn [Penicillins] Rash     rash   • Sulfa Drugs Rash     rash   • Latex        PHYSICAL EXAM  VITAL SIGNS: /107   Pulse 100   Temp 37.7 °C (99.9 °F) (Temporal)   Resp 16   Ht 1.676 m (5' 6\")   Wt 97.8 kg (215 lb 9.8 oz)   SpO2 97%   BMI 34.80 kg/m²   Constitutional: Alert in no apparent distress.  HENT: No signs of trauma, Bilateral external ears normal, Nose normal.   Eyes: Pupils are equal and reactive, Conjunctiva normal, Non-icteric.   Neck: Normal range of motion, No tenderness, Supple, No stridor.   Cardiovascular: Regular rate and rhythm, no murmurs.   Thorax & Lungs: Normal breath sounds, No respiratory distress, No wheezing, No chest tenderness.   Abdomen: Bowel sounds normal, Soft, No tenderness, No masses, No peritoneal signs.  Skin: Warm, Dry, No erythema, No rash. No jaundice.  Musculoskeletal:  No major deformities noted.   Neurologic: Alert, moving all extremities without difficulty, no focal deficits.    COURSE & MEDICAL DECISION MAKING  Pertinent Labs & Imaging studies reviewed. (See chart for details)    2:26 PM - Patient seen and examined at bedside. She is well appearing. The patient was informed I will be reviewing her " records from [facility] and contacting poison control to verify plan of care.     2:37 PM - Obtained and reviewed past medical records from [facility]. The patient presented to the ED for an accidental overdose after taking 24 tablets of 500 mg Tylenol and 24 tablets of 200 mg Ibuprofen. Lab work indicated the patient had a Tylenol level of 40 at 10 AM. The patient received loading dose of N-acetylcysteine at 11:14 AM. Dr. Dunne at Poison Control was consulted.    2:42 PM - Spoke with poison control regarding patient's plan of care.  recommends patient be medicated with N-acetylcysteine for 12 hours, and have lab work repeated after 12 hours. Case number #[id number]    2:51 PM - I discussed the patient's case and the above findings with Dr. Bro (Hospitalist) who agrees to admit the patient.     2:51 PM - Patient was reevaluated at bedside. She was informed I have reviewed all her previous lab work from [facility] and the patient will need to be admitted to the hospital. The patient was made aware she will need to continue to receive N-acetylcysteine for the Tylenol overdose. Patient is understanding and agreeable to discharge.       DISPOSITION:  Patient will be admitted to Dr. Bro, Hospitalist, in guarded condition.    FINAL IMPRESSION  1. Accidental drug overdose, initial encounter             This dictation has been created using voice recognition software and/or scribes. The accuracy of the dictation is limited by the abilities of the software and the expertise of the scribes. I expect there may be some errors of grammar and possibly content. I made every attempt to manually correct the errors within my dictation. However, errors related to voice recognition software and/or scribes may still exist and should be interpreted within the appropriate context.     I, Goran Chavez (Scribe), am scribing for, and in the presence of, Peyton Burton M.D..    Electronically signed by: Goran Chavez  (Scribe), 4/21/2019    IPeyton M.D. personally performed the services described in this documentation, as scribed by Goran Chavez in my presence, and it is both accurate and complete.    C.    The note accurately reflects work and decisions made by me.  Peyton Burton  4/21/2019  3:59 PM

## 2019-04-21 NOTE — ED NOTES
During initial assessment, pt became defensive about self-harm wounds and would not show RN, pt reports she wishes to refuse a skin assessment.  Education provided, education provided about excessive drinking and tylenol OD, pt's mom reports she wants to get a new nurse because I was too loud and not respecting pts privacy.   Apologized and explained procedures.  Pt asks to me to stay on as her nurse.

## 2019-04-22 VITALS
WEIGHT: 215.83 LBS | DIASTOLIC BLOOD PRESSURE: 99 MMHG | RESPIRATION RATE: 16 BRPM | HEIGHT: 66 IN | SYSTOLIC BLOOD PRESSURE: 149 MMHG | HEART RATE: 91 BPM | TEMPERATURE: 99.3 F | OXYGEN SATURATION: 98 % | BODY MASS INDEX: 34.69 KG/M2

## 2019-04-22 LAB
ALBUMIN SERPL BCP-MCNC: 4.3 G/DL (ref 3.2–4.9)
ALBUMIN/GLOB SERPL: 1.7 G/DL
ALP SERPL-CCNC: 49 U/L (ref 30–99)
ALT SERPL-CCNC: 12 U/L (ref 2–50)
ANION GAP SERPL CALC-SCNC: 7 MMOL/L (ref 0–11.9)
AST SERPL-CCNC: 25 U/L (ref 12–45)
BASOPHILS # BLD AUTO: 0.9 % (ref 0–1.8)
BASOPHILS # BLD: 0.08 K/UL (ref 0–0.12)
BILIRUB SERPL-MCNC: 0.8 MG/DL (ref 0.1–1.5)
BUN SERPL-MCNC: 5 MG/DL (ref 8–22)
CALCIUM SERPL-MCNC: 9.3 MG/DL (ref 8.5–10.5)
CHLORIDE SERPL-SCNC: 108 MMOL/L (ref 96–112)
CO2 SERPL-SCNC: 23 MMOL/L (ref 20–33)
CREAT SERPL-MCNC: 0.62 MG/DL (ref 0.5–1.4)
EOSINOPHIL # BLD AUTO: 0.15 K/UL (ref 0–0.51)
EOSINOPHIL NFR BLD: 1.6 % (ref 0–6.9)
ERYTHROCYTE [DISTWIDTH] IN BLOOD BY AUTOMATED COUNT: 38.4 FL (ref 35.9–50)
GLOBULIN SER CALC-MCNC: 2.5 G/DL (ref 1.9–3.5)
GLUCOSE SERPL-MCNC: 112 MG/DL (ref 65–99)
HCT VFR BLD AUTO: 39.7 % (ref 37–47)
HGB BLD-MCNC: 13.1 G/DL (ref 12–16)
IMM GRANULOCYTES # BLD AUTO: 0.02 K/UL (ref 0–0.11)
IMM GRANULOCYTES NFR BLD AUTO: 0.2 % (ref 0–0.9)
LYMPHOCYTES # BLD AUTO: 3.55 K/UL (ref 1–4.8)
LYMPHOCYTES NFR BLD: 38.8 % (ref 22–41)
MCH RBC QN AUTO: 27.3 PG (ref 27–33)
MCHC RBC AUTO-ENTMCNC: 33 G/DL (ref 33.6–35)
MCV RBC AUTO: 82.7 FL (ref 81.4–97.8)
MONOCYTES # BLD AUTO: 0.52 K/UL (ref 0–0.85)
MONOCYTES NFR BLD AUTO: 5.7 % (ref 0–13.4)
NEUTROPHILS # BLD AUTO: 4.82 K/UL (ref 2–7.15)
NEUTROPHILS NFR BLD: 52.8 % (ref 44–72)
NRBC # BLD AUTO: 0 K/UL
NRBC BLD-RTO: 0 /100 WBC
PLATELET # BLD AUTO: 295 K/UL (ref 164–446)
PMV BLD AUTO: 10.6 FL (ref 9–12.9)
POTASSIUM SERPL-SCNC: 4.1 MMOL/L (ref 3.6–5.5)
PROT SERPL-MCNC: 6.8 G/DL (ref 6–8.2)
RBC # BLD AUTO: 4.8 M/UL (ref 4.2–5.4)
SODIUM SERPL-SCNC: 138 MMOL/L (ref 135–145)
WBC # BLD AUTO: 9.1 K/UL (ref 4.8–10.8)

## 2019-04-22 PROCEDURE — 80053 COMPREHEN METABOLIC PANEL: CPT

## 2019-04-22 PROCEDURE — 85025 COMPLETE CBC W/AUTO DIFF WBC: CPT

## 2019-04-22 PROCEDURE — 700101 HCHG RX REV CODE 250: Performed by: FAMILY MEDICINE

## 2019-04-22 PROCEDURE — 99239 HOSP IP/OBS DSCHRG MGMT >30: CPT | Performed by: HOSPITALIST

## 2019-04-22 PROCEDURE — 36415 COLL VENOUS BLD VENIPUNCTURE: CPT

## 2019-04-22 PROCEDURE — 99253 IP/OBS CNSLTJ NEW/EST LOW 45: CPT | Performed by: PSYCHIATRY & NEUROLOGY

## 2019-04-22 RX ADMIN — POTASSIUM CHLORIDE AND SODIUM CHLORIDE: 900; 150 INJECTION, SOLUTION INTRAVENOUS at 06:21

## 2019-04-22 NOTE — CARE PLAN
Problem: Safety  Goal: Will remain free from falls  Outcome: PROGRESSING AS EXPECTED  Fall risk assessed, and in place.  Patient is independent with ambulation.  Patient educated not to get up with out assistance.  Patient verbalized understanding.  Bed alarm in place and on.  Call light with in reach.          Problem: Infection  Goal: Will remain free from infection  Outcome: PROGRESSING AS EXPECTED  Educated patient on hand washing. Standard precautions in place. Assessed patient for s/s of infection.

## 2019-04-22 NOTE — DISCHARGE SUMMARY
Discharge Summary    CHIEF COMPLAINT ON ADMISSION  Chief Complaint   Patient presents with   • Drug Overdose     pt transferred from [facility] due to insurance coverage; has a 20g RAC in place upon arrival; pt states she accidentally took 24 500 mg tylenol, 24 200 mg ibuprofen, was given loading dose of antidote at [facility] and sent here for second dose       Reason for Admission  [facility]     Admission Date  4/21/2019    CODE STATUS  Full Code    HPI & HOSPITAL COURSE  This is a 23 y.o. female here with no medical hx comes to the hospital after having a night of binge drinking. She had a headache in which she took bottles of ibuprofen and acetaminophen. The patient realized she had accidentally taken 24 tablets of 500 mg Tylenol and 24 tables of 200 mg Ibuprofen while intoxicated She denied if having any symptoms. She presented dot [facility] in which NAC was started because her blood acetaminophen levels were above 40. She was transferred to [facility] for further care.  When patient arrived to the hospital blood pressure 156/107, pulse was 100 temperature of 99.  Patient's CMP levels were within normal limits and had no evidence of AK I.  Blood acetaminophen levels were less than 10.  She finished a course of NAC for 20 hours.  She tolerated well and had no acute effects.  There was cuts on her arm found on examination.  Psych was consulted to evaluate patient for possible suicide attempt. These cuts were superfical and unlikely a attempt of suicide. Psych determined that she does not meet the factors of legal hold.       Therefore, she is discharged in good and stable condition to home with close outpatient follow-up.    The patient recovered much more quickly than anticipated on admission.    Discharge Date  4/22    FOLLOW UP ITEMS POST DISCHARGE  Follow up PCP    DISCHARGE DIAGNOSES  Principal Problem:    Accidental acetaminophen overdose POA: Yes  Active Problems:    Accidental ibuprofen overdose POA:  Yes    Alcohol use POA: Yes  Resolved Problems:    * No resolved hospital problems. *      FOLLOW UP  No future appointments.  No follow-up provider specified.    MEDICATIONS ON DISCHARGE     Medication List      You have not been prescribed any medications.         Allergies  Allergies   Allergen Reactions   • Gluten Diarrhea     Bloating/diarrhea   • Lactose Diarrhea     Bloating/diarrhea   • Pcn [Penicillins] Rash     rash   • Sulfa Drugs Rash     rash   • Latex        DIET  Orders Placed This Encounter   Procedures   • Diet Order Regular     Standing Status:   Standing     Number of Occurrences:   1     Order Specific Question:   Diet:     Answer:   Regular [1]       ACTIVITY  As tolerated.  Weight bearing as tolerated    CONSULTATIONS  Psych    PROCEDURES  none    LABORATORY  Lab Results   Component Value Date    SODIUM 138 04/22/2019    POTASSIUM 4.1 04/22/2019    CHLORIDE 108 04/22/2019    CO2 23 04/22/2019    GLUCOSE 112 (H) 04/22/2019    BUN 5 (L) 04/22/2019    CREATININE 0.62 04/22/2019        Lab Results   Component Value Date    WBC 9.1 04/22/2019    HEMOGLOBIN 13.1 04/22/2019    HEMATOCRIT 39.7 04/22/2019    PLATELETCT 295 04/22/2019        Total time of the discharge process exceeds 40 minutes.

## 2019-04-22 NOTE — PROGRESS NOTES
Patient transferred from ED to T8 via gurney.  Tele box on, monitor room notified.  Monitor rhythm  SR-ST , .  Vital signs stable, patient updated on plan of care.  All safety measures in place.  Bedside report received.

## 2019-04-22 NOTE — H&P
Hospital Medicine History & Physical Note    Date of Service  4/21/2019    Primary Care Physician  KARTIK Nagel    Consultants  None    Code Status  Full    Chief Complaint  Accidental overdose    History of Presenting Illness  23 y.o. female who presented 4/21/2019 with accidental overdose of Tylenol and ibuprofen.  Patient states she went out drinking last night admits that she did not drink alcohol heavily.  She started having a headache, she had in her purse a small bottle of Tylenol as well as ibuprofen.  She did start taking these medications for the headache.  When she woke up this morning she noticed that she had finished a whole bottle of Tylenol and ibuprofen which according to her contained 24 tablets each.  She immediately sought care in outside hospital for a Tylenol level was taken and was noted to be 40, CMP was within normal limits, N-acetylcysteine was started.  She then transferred over here where is it where N-acetylcysteine has been continued.  Poison control has been called on the case.  She has had some nausea when the medication was started but no vomiting.  She denies any fever chills, cough congestion,, pain, chest pain palpitation shortness of breath.  She denies any suicidal ideation or thoughts.  She states that it was purely accidental.  \  Review of Systems  Review of Systems   Constitutional: Negative for chills, fever and malaise/fatigue.   HENT: Negative for hearing loss and sore throat.    Eyes: Negative for blurred vision.   Respiratory: Negative for cough, shortness of breath and wheezing.    Cardiovascular: Negative for chest pain, palpitations and leg swelling.   Gastrointestinal: Positive for nausea. Negative for abdominal pain, diarrhea, heartburn and vomiting.   Genitourinary: Negative for dysuria.   Musculoskeletal: Negative for back pain and neck pain.   Skin: Negative for rash.   Neurological: Negative for dizziness, sensory change, speech change, focal weakness,  weakness and headaches.   Psychiatric/Behavioral: Negative for suicidal ideas. The patient is not nervous/anxious.        Past Medical History   has a past medical history of Bronchitis (2005) and Other specified disorder of intestines. She also has no past medical history of Pain.    Surgical History   has a past surgical history that includes other (1997); other (2012); and tonsillectomy (12/13/2013).     Family History  family history includes Alcohol/Drug in her father; Arthritis in her father; Kidney Disease in her father; No Known Problems in her mother.     Social History   reports that she has never smoked. She has never used smokeless tobacco. She reports that she drinks alcohol. She reports that she does not use drugs.    Allergies  Allergies   Allergen Reactions   • Gluten Diarrhea     Bloating/diarrhea   • Lactose Diarrhea     Bloating/diarrhea   • Pcn [Penicillins] Rash     rash   • Sulfa Drugs Rash     rash   • Latex        Medications  None       Physical Exam  Temp:  [37.3 °C (99.2 °F)-37.7 °C (99.9 °F)] 37.3 °C (99.2 °F)  Pulse:  [] 100  Resp:  [14-21] 15  BP: (156-187)/() 157/94  SpO2:  [92 %-99 %] 99 %    Physical Exam   Constitutional: She is oriented to person, place, and time. She appears well-developed and well-nourished.   HENT:   Head: Normocephalic and atraumatic.   Eyes: Pupils are equal, round, and reactive to light. Conjunctivae are normal.   Neck: No tracheal deviation present. No thyromegaly present.   Cardiovascular: Normal rate and regular rhythm.    Pulmonary/Chest: Effort normal and breath sounds normal.   Abdominal: Soft. Bowel sounds are normal. She exhibits no distension. There is no tenderness.   Musculoskeletal: She exhibits no edema.   Lymphadenopathy:     She has no cervical adenopathy.   Neurological: She is alert and oriented to person, place, and time.   Skin: Skin is warm and dry.   Nursing note and vitals reviewed.      Laboratory:          No results for  input(s): ALTSGPT, ASTSGOT, ALKPHOSPHAT, TBILIRUBIN, DBILIRUBIN, GAMMAGT, AMYLASE, LIPASE, ALB, PREALBUMIN, GLUCOSE in the last 72 hours.              No results for input(s): TROPONINI in the last 72 hours.    Urinalysis:    No results found     Imaging:  No orders to display         Assessment/Plan:  I anticipate this patient will require at least two midnights for appropriate medical management, necessitating inpatient admission.    * Accidental acetaminophen overdose- (present on admission)   Assessment & Plan    N acetylcysteine  Follow CMP, INR  IVF hydration     Accidental ibuprofen overdose- (present on admission)   Assessment & Plan    IVF hydration   Follow CBC, CMP     Alcohol use- (present on admission)   Assessment & Plan    Watch for withdrawal symptoms         VTE prophylaxis: SCD

## 2019-04-22 NOTE — PROGRESS NOTES
Assumed care from Peyton HERRERA. Received bedside report.  Updated patient on daily plan of care on white board. Patient denies any additional needs at this time.  Patient belongings and call light with in reach.  Vitals stable.Will continue to monitor.

## 2019-04-22 NOTE — PSYCHIATRY
"PSYCHIATRIC CONSULTATION:  *Reason for admission:  OD on tylenol and ibuprofen    *Reason for consult: self harm  *Requesting Physician:Eduin Rogers M.D.     Chart reviewed. 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 While she was intoxicated, she received a few texts from her ex-step father trying to get closer to her again, which upset her and made her angry, which caused her to cut herlself superficially after she got home. Pt reported she was having a headache and had two brand new bottles of tylenol and ibuprofen with her. She states she always carry them in her purse for headaches. She was having a headache and ended up taking 24 for from each bottle in a spam of 1-2 hours. She remembers when she started to take them but does not recall taking them all. When she woke up in the moring she realized she had overdose and called poison control herself and told her mother who brought her to the hospital;. Pt said she felt very scared when she realized she had overdosed. Pt denied any SA, and denies having any suicidal thoughts prior getting intoxicated. She reported she has been doing very well, and for the past 2 weeks at least, denied any depressed mood, anhedonia, change in sleep, or appetite, ahs good energy and concentration and has been working. She also denied ever experiencing any maniac or psychosis symptoms. No symptoms of anxiety or psychosis. Pt reported she loves her job, and was farid that her OD would affect her job, and was concerned to come to [facility]. Pt has a hx of cutting in  and stated that " "she stopped cutting after having therapy. She had not cut herself for the past 8 years.     AS per mother, pt has been doing very well, and denied any recent change in mood or behavior. She reported that pt has been working and denied her making any suicidal statements. She does not believe this was a suicide attempt and has no safety concerns with her discharge at this time. She will be going home with her after discharge.       Psychiatric Review of Systems:current symptoms as reported by pt.  Depression: denies       Montse:denies  Anxiety/Panic Attacks denies  PTSD symptom: denies  Psychosis:denies      Medical Review of Systems: as reported by pt. All systems reviewed.all other systems were reviewed and are negative     For teaching purposes the systems below must be noted, + or negative:  Neurological:    TBIs:denies   SZs:denies    Strokes:denies     Other medical symptoms:   Thyroid:denies   Diabetes:denies   Cardiovascular disease:denies    Psychiatric Examination: observed phenomenon:  Vitals:  Vitals:    04/22/19 0755   BP: 129/89   Pulse: 64   Resp: 16   Temp: 36.9 °C (98.4 °F)   SpO2: 95%       Appearance:  woman, overweight, fair grooming and hygiene  Calm and cooperative  Good eye contact  Multiple superficial cuts in her right arm  Muscle Strength/Tone:no psychomotor retardation or agitation  Gait/Station:not tested  Speech:normal; volume, tone and rhythm   Thought Process:linear and goal oriented  Associations:none  Abnormal/Psychotic Thoughts (ex):denies  Insight/Judgement:good/good  Orientation:grossly oriented  Memory:intact  Attention/Concentration:intact  Language:fluent in English  Mood: \"I am doing fine\"           Affect: full range, appropriate          SI/HI:   Denies any SI/HI  Neurological Testing:( ie clock, cube drawing, MMSE, MOCA,etc.)n/a     Past Psychiatric Hx: reported hx of depression in HS and SIB (cutting), with no prior psychiatric hospitalizations, not on psychotropic " meds, no hx of SA, not connected to outpatient psychiatric services.     Family Psychiatric Hx:denies    Social Hx:single, no children, domiciled with mother, has a degree in nursing, employed as a nurse    Drug/Alcohol/Tobacco Hx:   Drugs:denies   Alcohol:socially   Tobacco:denies    Medical Hx: labs, MARS, medications, etc were reviewed. Only those findings of potential interest to psychiatry are noted below:  Medical Conditions:   Past Medical History:   Diagnosis Date   • Bronchitis 2005   • Other specified disorder of intestines     Secondary to Gluten allergy         Allergies: Gluten; Lactose; Pcn [penicillins]; Sulfa drugs; and Latex  Medications (currently prescribed at [facility]):  Labs:  Recent Results (from the past 72 hour(s))   ACETAMINOPHEN    Collection Time: 04/21/19 10:02 PM   Result Value Ref Range    Acetaminophen -Tylenol <10 10 - 30 ug/mL   Comp Metabolic Panel    Collection Time: 04/21/19 10:02 PM   Result Value Ref Range    Sodium 138 135 - 145 mmol/L    Potassium 3.8 3.6 - 5.5 mmol/L    Chloride 106 96 - 112 mmol/L    Co2 21 20 - 33 mmol/L    Anion Gap 11.0 0.0 - 11.9    Glucose 87 65 - 99 mg/dL    Bun 6 (L) 8 - 22 mg/dL    Creatinine 0.59 0.50 - 1.40 mg/dL    Calcium 9.2 8.5 - 10.5 mg/dL    AST(SGOT) 18 12 - 45 U/L    ALT(SGPT) 14 2 - 50 U/L    Alkaline Phosphatase 52 30 - 99 U/L    Total Bilirubin 0.8 0.1 - 1.5 mg/dL    Albumin 3.9 3.2 - 4.9 g/dL    Total Protein 6.3 6.0 - 8.2 g/dL    Globulin 2.4 1.9 - 3.5 g/dL    A-G Ratio 1.6 g/dL   MAGNESIUM    Collection Time: 04/21/19 10:02 PM   Result Value Ref Range    Magnesium 1.8 1.5 - 2.5 mg/dL   PHOSPHORUS    Collection Time: 04/21/19 10:02 PM   Result Value Ref Range    Phosphorus 3.7 2.5 - 4.5 mg/dL   ESTIMATED GFR    Collection Time: 04/21/19 10:02 PM   Result Value Ref Range    GFR If African American >60 >60 mL/min/1.73 m 2    GFR If Non African American >60 >60 mL/min/1.73 m 2   CBC with Differential    Collection Time: 04/22/19  3:58 AM    Result Value Ref Range    WBC 9.1 4.8 - 10.8 K/uL    RBC 4.80 4.20 - 5.40 M/uL    Hemoglobin 13.1 12.0 - 16.0 g/dL    Hematocrit 39.7 37.0 - 47.0 %    MCV 82.7 81.4 - 97.8 fL    MCH 27.3 27.0 - 33.0 pg    MCHC 33.0 (L) 33.6 - 35.0 g/dL    RDW 38.4 35.9 - 50.0 fL    Platelet Count 295 164 - 446 K/uL    MPV 10.6 9.0 - 12.9 fL    Neutrophils-Polys 52.80 44.00 - 72.00 %    Lymphocytes 38.80 22.00 - 41.00 %    Monocytes 5.70 0.00 - 13.40 %    Eosinophils 1.60 0.00 - 6.90 %    Basophils 0.90 0.00 - 1.80 %    Immature Granulocytes 0.20 0.00 - 0.90 %    Nucleated RBC 0.00 /100 WBC    Neutrophils (Absolute) 4.82 2.00 - 7.15 K/uL    Lymphs (Absolute) 3.55 1.00 - 4.80 K/uL    Monos (Absolute) 0.52 0.00 - 0.85 K/uL    Eos (Absolute) 0.15 0.00 - 0.51 K/uL    Baso (Absolute) 0.08 0.00 - 0.12 K/uL    Immature Granulocytes (abs) 0.02 0.00 - 0.11 K/uL    NRBC (Absolute) 0.00 K/uL       ECG: not done    Cranial Imaging:not done        ASSESSMENT: 24 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 Pt was admitted after OD on tylenol and ibuprofen  and cutting her self superficially while intoxicated with alcohol. Pt has been denying any suicide attempt since arrival and continues to deny it. She sought helped as soon as she realized she had overdosed and called for help herself. Pt denies any acute symptoms of signs of depression, bentley, psychosis or anxiety int he past 2 weeks. No thoughts of suicide either. Mother denied any change in behavior or mood. Mother has no safety concerns with her discharge. Pt denies any SI/HI, is domiciled, employed with good family support, therefore is at low risk of danger to herlsef at this time. Pt is psychiatrically cleared for discharge.     substance induced mood disorder      PLAN:  Legal status: voluntary  NO  psychotropic medication is warranted at this time  F/up with PCP   Case discussed with medical team  Signing off   Thank you for the consult.

## 2019-04-22 NOTE — DISCHARGE INSTRUCTIONS
Discharge Instructions    Discharged to home by car with relative. Discharged via walking, hospital escort: Refused.  Special equipment needed: Not Applicable    Be sure to schedule a follow-up appointment with your primary care doctor or any specialists as instructed.     Discharge Plan:   Diet Plan: Discussed  Activity Level: Discussed  Confirmed Follow up Appointment: Patient to Call and Schedule Appointment  Confirmed Symptoms Management: Discussed  Medication Reconciliation Updated: Yes  Influenza Vaccine Indication: Patient Refuses, Not indicated: Previously immunized this influenza season and > 8 years of age    I understand that a diet low in cholesterol, fat, and sodium is recommended for good health. Unless I have been given specific instructions below for another diet, I accept this instruction as my diet prescription.   Other diet:     Special Instructions: None    · Is patient discharged on Warfarin / Coumadin?   No     Depression / Suicide Risk    As you are discharged from this [facility] facility, it is important to learn how to keep safe from harming yourself.    Recognize the warning signs:  · Abrupt changes in personality, positive or negative- including increase in energy   · Giving away possessions  · Change in eating patterns- significant weight changes-  positive or negative  · Change in sleeping patterns- unable to sleep or sleeping all the time   · Unwillingness or inability to communicate  · Depression  · Unusual sadness, discouragement and loneliness  · Talk of wanting to die  · Neglect of personal appearance   · Rebelliousness- reckless behavior  · Withdrawal from people/activities they love  · Confusion- inability to concentrate     If you or a loved one observes any of these behaviors or has concerns about self-harm, here's what you can do:  · Talk about it- your feelings and reasons for harming yourself  · Remove any means that you might use to hurt yourself (examples: pills, rope,  extension cords, firearm)  · Get professional help from the community (Mental Health, Substance Abuse, psychological counseling)  · Do not be alone:Call your Safe Contact- someone whom you trust who will be there for you.  · Call your local CRISIS HOTLINE 048-4645 or 614-205-3126  · Call your local Children's Mobile Crisis Response Team [place] (975) 000-8316 or www.[employer]  · Call the toll free National Suicide Prevention Hotlines   · National Suicide Prevention Lifeline 595-794-WMIZ (1390)  · National Hope Line Network 800-SUICIDE (516-8894)      Alcohol Use Disorder  Alcohol use disorder is when your drinking disrupts your daily life. When you have this condition, you drink too much alcohol and you cannot control your drinking.  Alcohol use disorder can cause serious problems with your physical health. It can affect your brain, heart, liver, pancreas, immune system, stomach, and intestines. Alcohol use disorder can increase your risk for certain cancers and cause problems with your mental health, such as depression, anxiety, psychosis, delirium, and dementia. People with this disorder risk hurting themselves and others.  What are the causes?  This condition is caused by drinking too much alcohol over time. It is not caused by drinking too much alcohol only one or two times. Some people with this condition drink alcohol to cope with or escape from negative life events. Others drink to relieve pain or symptoms of mental illness.  What increases the risk?  You are more likely to develop this condition if:  · You have a family history of alcohol use disorder.  · Your culture encourages drinking to the point of intoxication, or makes alcohol easy to get.  · You had a mood or conduct disorder in childhood.  · You have been a victim of abuse.  · You are an adolescent and:  ¨ You have poor grades or difficulties in school.  ¨ Your caregivers do not talk to you about saying no to alcohol, or supervise your  activities.  ¨ You are impulsive or you have trouble with self-control.  What are the signs or symptoms?  Symptoms of this condition include:  · Drinking more than you want to.  · Drinking for longer than you want to.  · Trying several times to drink less or to control your drinking.  · Spending a lot of time getting alcohol, drinking, or recovering from drinking.  · Craving alcohol.  · Having problems at work, at school, or at home due to drinking.  · Having problems in relationships due to drinking.  · Drinking when it is dangerous to drink, such as before driving a car.  · Continuing to drink even though you know you might have a physical or mental problem related to drinking.  · Needing more and more alcohol to get the same effect you want from the alcohol (building up tolerance).  · Having symptoms of withdrawal when you stop drinking. Symptoms of withdrawal include:  ¨ Fatigue.  ¨ Nightmares.  ¨ Trouble sleeping.  ¨ Depression.  ¨ Anxiety.  ¨ Fever.  ¨ Seizures.  ¨ Severe confusion.  ¨ Feeling or seeing things that are not there (hallucinations).  ¨ Tremors.  ¨ Rapid heart rate.  ¨ Rapid breathing.  ¨ High blood pressure.  · Drinking to avoid symptoms of withdrawal.  How is this diagnosed?  This condition is diagnosed with an assessment. Your health care provider may start the assessment by asking three or four questions about your drinking.  Your health care provider may perform a physical exam or do lab tests to see if you have physical problems resulting from alcohol use. She or he may refer you to a mental health professional for evaluation.  How is this treated?  Some people with alcohol use disorder are able to reduce their alcohol use to low-risk levels. Others need to completely quit drinking alcohol. When necessary, mental health professionals with specialized training in substance use treatment can help. Your health care provider can help you decide how severe your alcohol use disorder is and what  type of treatment you need. The following forms of treatment are available:  · Detoxification. Detoxification involves quitting drinking and using prescription medicines within the first week to help lessen withdrawal symptoms. This treatment is important for people who have had withdrawal symptoms before and for heavy drinkers who are likely to have withdrawal symptoms. Alcohol withdrawal can be dangerous, and in severe cases, it can cause death. Detoxification may be provided in a home, community, or primary care setting, or in a hospital or substance use treatment facility.  · Counseling. This treatment is also called talk therapy. It is provided by substance use treatment counselors. A counselor can address the reasons you use alcohol and suggest ways to keep you from drinking again or to prevent problem drinking. The goals of talk therapy are to:  ¨ Find healthy activities and ways for you to cope with stress.  ¨ Identify and avoid the things that trigger your alcohol use.  ¨ Help you learn how to handle cravings.  · Medicines. Medicines can help treat alcohol use disorder by:  ¨ Decreasing alcohol cravings.  ¨ Decreasing the positive feeling you have when you drink alcohol.  ¨ Causing an uncomfortable physical reaction when you drink alcohol (aversion therapy).  · Support groups. Support groups are led by people who have quit drinking. They provide emotional support, advice, and guidance.  These forms of treatment are often combined. Some people with this condition benefit from a combination of treatments provided by specialized substance use treatment centers.  Follow these instructions at home:  · Take over-the-counter and prescription medicines only as told by your health care provider.  · Check with your health care provider before starting any new medicines.  · Ask friends and family members not to offer you alcohol.  · Avoid situations where alcohol is served, including gatherings where others are  drinking alcohol.  · Create a plan for what to do when you are tempted to use alcohol.  · Find hobbies or activities that you enjoy that do not include alcohol.  · Keep all follow-up visits as told by your health care provider. This is important.  How is this prevented?  · If you drink, limit alcohol intake to no more than 1 drink a day for nonpregnant women and 2 drinks a day for men. One drink equals 12 oz of beer, 5 oz of wine, or 1½ oz of hard liquor.  · If you have a mental health condition, get treatment and support.  · Do not give alcohol to adolescents.  · If you are an adolescent:  ¨ Do not drink alcohol.  ¨ Do not be afraid to say no if someone offers you alcohol. Speak up about why you do not want to drink. You can be a positive role model for your friends and set a good example for those around you by not drinking alcohol.  ¨ If your friends drink, spend time with others who do not drink alcohol. Make new friends who do not use alcohol.  ¨ Find healthy ways to manage stress and emotions, such as meditation or deep breathing, exercise, spending time in nature, listening to music, or talking with a trusted friend or family member.  Contact a health care provider if:  · You are not able to take your medicines as told.  · Your symptoms get worse.  · You return to drinking alcohol (relapse) and your symptoms get worse.  Get help right away if:  · You have thoughts about hurting yourself or others.  If you ever feel like you may hurt yourself or others, or have thoughts about taking your own life, get help right away. You can go to your nearest emergency department or call:  · Your local emergency services (911 in the [place]  · A suicide crisis helpline, such as the National Suicide Prevention Lifeline at 1-207.466.5742. This is open 24 hours a day.  Summary  · Alcohol use disorder is when your drinking disrupts your daily life. When you have this condition, you drink too much alcohol and you cannot control  your drinking.  · Treatment may include detoxification, counseling, medicine, and support groups.  · Ask friends and family members not to offer you alcohol. Avoid situations where alcohol is served.  · Get help right away if you have thoughts about hurting yourself or others.  This information is not intended to replace advice given to you by your health care provider. Make sure you discuss any questions you have with your health care provider.  Document Released: 01/25/2006 Document Revised: 09/14/2017 Document Reviewed: 09/14/2017  [employer] Interactive Patient Education © 2017 [employer]

## 2019-04-22 NOTE — PROGRESS NOTES
Report received. Care assumed. Patient A&Ox4. Pt reports feeling 0/10 pain, no SOB at this time.  Pt denies any needs.Discussed POC for the night with Pt. Call light within reach, encouraged pt to call for assistance. Pt's mother at bedside.

## 2019-04-22 NOTE — CARE PLAN
Problem: Infection  Goal: Will remain free from infection  Outcome: PROGRESSING AS EXPECTED      Problem: Knowledge Deficit  Goal: Knowledge of disease process/condition, treatment plan, diagnostic tests, and medications will improve  Outcome: PROGRESSING AS EXPECTED  Pt educated regarding activity, diet, meds and plan of care. Verbalized understanding.

## 2019-04-23 NOTE — PROGRESS NOTES
Patient discharged home. Provided discharge instructions and prescriptions. IV removed, patient tolerated well.  Tele box removed.  Left unit walking without incident.

## 2019-08-25 NOTE — LETTER
Anxiety HPI





- General


Chief Complaint: Anxiety


Stated Complaint: ANXIETY


Time Seen by Provider: 19 02:58


Source: patient


Mode of arrival: ambulatory





- History of Present Illness


Initial Comments: 


30-year-old female patient presents to the emergency department today for 

evaluation of acute anxiety attack.  Patient was waiting for ride in the waiting

room of the emergency department when an unresponsive child was brought in. 

Patient states that she lost her 2 year old son  of last year. This 

triggered her anxiety and panic. Patient is requesting medication for anxiety. 

States she generally takes Ativan or Xanax. Patient denies any chest pain or 

shortness of breath. States she just feels shaky and panicked. Patient denies 

any recent rash, fever, chills, abdominal pain, nausea, vomiting, diarrhea, 

constipation, back pain, numbness, tingling, dizziness, weakness, headache, 

visual changes, or any other complaints.








- Related Data


Home Medications: 


                                Home Medications











 Medication  Instructions  Recorded  Confirmed


 


Acetaminophen [Tylenol 8 Hour] 650 mg PO Q4H PRN 19


 


Chlorpheniramine Maleate 4 mg PO Q4HR PRN 19





[Chlor-Trimeton]   


 


Hyoscyamine Sulfate [Levsin] 0.125 mg PO Q6H PRN 19


 


Ibuprofen [Motrin] 600 mg PO Q6H PRN 19


 


Multivitamins, Thera [Multivitamin 1 tab PO DAILY 19





(formulary)]   


 


Mylanta 30 ml PO Q4H PRN 19


 


Ondansetron HCl [Zofran] 8 mg PO Q6H PRN 19


 


Ondansetron [Zofran] 4 mg IM Q6H PRN 19


 


Pantoprazole [Protonix] 40 mg PO DAILY 19


 


Sucralfate [Carafate] 1 gm PO ACHS 19


 


Thiamine [Vitamin B-1] 100 mg PO DAILY 19


 


Tigan 300mg Supp 300 mg RECTAL Q6H PRN 19


 


Trimethobenzamide [Tigan] 300 mg PO Q6H PRN 19


 


busPIRone HCl [Buspar] 10 mg PO TID PRN 19


 


traZODone HCL [Desyrel] 50 - 150 mg PO HS 19











Allergies/Adverse Reactions: 


                                    Allergies











Allergy/AdvReac Type Severity Reaction Status Date / Time


 


No Known Allergies Allergy   Verified 19 20:44














Review of Systems


ROS Statement: 


Those systems with pertinent positive or pertinent negative responses have been 

documented in the HPI.





ROS Other: All systems not noted in ROS Statement are negative.





Past Medical History


Additional Past Medical History / Comment(s): kidney stones


History of Any Multi-Drug Resistant Organisms: None Reported


Past Surgical History:  Section


Additional Past Surgical History / Comment(s): kidney stents


Past Psychological History: Anxiety, Bipolar, Depression


Smoking Status: Current every day smoker


Past Alcohol Use History: Rare


Past Drug Use History: Marijuana





General Exam


Limitations: no limitations


General appearance: alert, in no apparent distress, anxious, other (Physical 

well-developed, well-nourished adult female patient in no acute distress.  Vital

 signs upon presentation are temperature 97.9F, pulse 120, respirations 20, 

blood pressure 124/81, pulse ox 99% on room air.)


Eye exam: Present: normal appearance, PERRL, EOMI.  Absent: scleral icterus, 

conjunctival injection, periorbital swelling


ENT exam: Present: normal exam, normal oropharynx, mucous membranes moist


Respiratory exam: Present: normal lung sounds bilaterally.  Absent: respiratory 

distress, wheezes, rales, rhonchi, stridor


Cardiovascular Exam: Present: normal rhythm, tachycardia, normal heart sounds.  

Absent: systolic murmur, diastolic murmur, rubs, gallop, clicks


Neurological exam: Present: alert, oriented X3, CN II-XII intact


Psychiatric exam: Present: anxious, other (Tearful)


Skin exam: Present: warm, dry, intact, normal color.  Absent: rash





Course


                                   Vital Signs











  19





  02:44


 


Temperature 97.9 F


 


Pulse Rate 120 H


 


Respiratory 20





Rate 


 


Blood Pressure 124/81


 


O2 Sat by Pulse 99





Oximetry 














Medical Decision Making





- Medical Decision Making


30-year-old female patient percents to the emergency department today for 

evaluation of anxiety attack.  She is requesting anxiety medication.  Patient 

was given a dose of medication here in the emergency department.  She is 

requesting discharge.  She does have a ride home.  Return parameters were 

discussed in detail.  She is instructed to follow-up with her primary care 

physician for recheck in 1-2 days.  She verbalizes understanding and agrees with

 this plan.








Disposition


Clinical Impression: 


 Anxiety





Disposition: HOME SELF-CARE


Condition: Good


Instructions (If sedation given, give patient instructions):  Generalized 

Anxiety Disorder (ED)


Additional Instructions: 


Follow up with your primary care physician for recheck in 1-2 days. Return to 

the emergency department for any new, worsening, or concerning symptoms. 


Is patient prescribed a controlled substance at d/c from ED?: No


Referrals: 


None,Stated [Primary Care Provider] - 1-2 days


Time of Disposition: 03:15 January 21, 2019         Patient: Dian Tinsley   YOB: 1995   Date of Visit: 1/21/2019           To Whom it May Concern:    Dian Tinsley was seen in my clinic on 1/21/2019. Please excuse her absence today, 1/21/19. She may return to work on 1/25/19.     If you have any questions or concerns, please don't hesitate to call.        Sincerely,           Dian Schuster P.A.-C.  Electronically Signed

## 2019-09-30 ENCOUNTER — HOSPITAL ENCOUNTER (OUTPATIENT)
Dept: LAB | Facility: MEDICAL CENTER | Age: 24
End: 2019-09-30
Payer: COMMERCIAL

## 2019-09-30 LAB
BDY FAT % MEASURED: 37.3 %
BP DIAS: 99 MMHG
BP SYS: 136 MMHG
CHOLEST SERPL-MCNC: 196 MG/DL (ref 100–199)
DIABETES HTDIA: NO
EVENT NAME HTEVT: NORMAL
FASTING HTFAS: YES
GLUCOSE SERPL-MCNC: 82 MG/DL (ref 65–99)
HDLC SERPL-MCNC: 39 MG/DL
HYPERTENSION HTHYP: NO
LDLC SERPL CALC-MCNC: 131 MG/DL
SCREENING LOC CITY HTCIT: NORMAL
SCREENING LOC STATE HTSTA: NORMAL
SCREENING LOCATION HTLOC: NORMAL
SMOKING HTSMO: NO
SUBSCRIBER ID HTSID: NORMAL
TRIGL SERPL-MCNC: 128 MG/DL (ref 0–149)

## 2019-09-30 PROCEDURE — 36415 COLL VENOUS BLD VENIPUNCTURE: CPT

## 2019-09-30 PROCEDURE — 80061 LIPID PANEL: CPT

## 2019-09-30 PROCEDURE — 82947 ASSAY GLUCOSE BLOOD QUANT: CPT

## 2019-09-30 PROCEDURE — S5190 WELLNESS ASSESSMENT BY NONPH: HCPCS

## 2019-10-04 ENCOUNTER — DOCUMENTATION (OUTPATIENT)
Dept: OCCUPATIONAL MEDICINE | Facility: CLINIC | Age: 24
End: 2019-10-04

## 2019-10-22 ENCOUNTER — EH NON-PROVIDER (OUTPATIENT)
Dept: OCCUPATIONAL MEDICINE | Facility: CLINIC | Age: 24
End: 2019-10-22

## 2019-10-22 PROCEDURE — 94375 RESPIRATORY FLOW VOLUME LOOP: CPT | Performed by: PREVENTIVE MEDICINE

## 2020-09-29 ENCOUNTER — IMMUNIZATION (OUTPATIENT)
Dept: OCCUPATIONAL MEDICINE | Facility: CLINIC | Age: 25
End: 2020-09-29

## 2020-09-29 DIAGNOSIS — Z23 NEED FOR VACCINATION: ICD-10-CM

## 2020-09-29 PROCEDURE — 90686 IIV4 VACC NO PRSV 0.5 ML IM: CPT | Performed by: NURSE PRACTITIONER

## 2020-10-16 ENCOUNTER — HOSPITAL ENCOUNTER (OUTPATIENT)
Dept: RADIOLOGY | Facility: MEDICAL CENTER | Age: 25
End: 2020-10-16
Attending: OBSTETRICS & GYNECOLOGY
Payer: COMMERCIAL

## 2020-10-16 DIAGNOSIS — N93.9 ABNORMAL UTERINE BLEEDING: ICD-10-CM

## 2020-10-16 PROCEDURE — 76856 US EXAM PELVIC COMPLETE: CPT

## 2020-12-20 DIAGNOSIS — Z23 NEED FOR VACCINATION: ICD-10-CM

## 2020-12-31 ENCOUNTER — IMMUNIZATION (OUTPATIENT)
Dept: FAMILY PLANNING/WOMEN'S HEALTH CLINIC | Facility: IMMUNIZATION CENTER | Age: 25
End: 2020-12-31
Attending: FAMILY MEDICINE
Payer: COMMERCIAL

## 2020-12-31 DIAGNOSIS — Z23 ENCOUNTER FOR VACCINATION: Primary | ICD-10-CM

## 2020-12-31 DIAGNOSIS — Z23 NEED FOR VACCINATION: ICD-10-CM

## 2020-12-31 PROCEDURE — 91301 MODERNA SARS-COV-2 VACCINE: CPT

## 2020-12-31 PROCEDURE — 0011A MODERNA SARS-COV-2 VACCINE: CPT

## 2021-01-08 ENCOUNTER — OFFICE VISIT (OUTPATIENT)
Dept: MEDICAL GROUP | Facility: MEDICAL CENTER | Age: 26
End: 2021-01-08
Payer: COMMERCIAL

## 2021-01-08 VITALS
TEMPERATURE: 97.5 F | DIASTOLIC BLOOD PRESSURE: 76 MMHG | SYSTOLIC BLOOD PRESSURE: 124 MMHG | HEIGHT: 66 IN | BODY MASS INDEX: 37.77 KG/M2 | WEIGHT: 235 LBS | OXYGEN SATURATION: 94 % | HEART RATE: 84 BPM | RESPIRATION RATE: 16 BRPM

## 2021-01-08 DIAGNOSIS — Z91.09 ENVIRONMENTAL ALLERGIES: ICD-10-CM

## 2021-01-08 DIAGNOSIS — E66.9 OBESITY (BMI 30-39.9): ICD-10-CM

## 2021-01-08 DIAGNOSIS — L30.9 DERMATITIS: ICD-10-CM

## 2021-01-08 PROBLEM — F10.90 ALCOHOL USE: Status: RESOLVED | Noted: 2019-04-21 | Resolved: 2021-01-08

## 2021-01-08 PROBLEM — Z78.9 ALCOHOL USE: Status: RESOLVED | Noted: 2019-04-21 | Resolved: 2021-01-08

## 2021-01-08 PROCEDURE — 99213 OFFICE O/P EST LOW 20 MIN: CPT | Performed by: NURSE PRACTITIONER

## 2021-01-08 RX ORDER — TRIAMCINOLONE ACETONIDE 1 MG/G
1 CREAM TOPICAL 2 TIMES DAILY
Qty: 30 G | Refills: 1 | Status: SHIPPED | OUTPATIENT
Start: 2021-01-08 | End: 2022-03-22

## 2021-01-08 ASSESSMENT — FIBROSIS 4 INDEX: FIB4 SCORE: 0.61

## 2021-01-08 ASSESSMENT — PATIENT HEALTH QUESTIONNAIRE - PHQ9: CLINICAL INTERPRETATION OF PHQ2 SCORE: 0

## 2021-01-08 NOTE — PROGRESS NOTES
Subjective:      Dian Tinsley is a 25 y.o. female who presents with Other (breast left one itchy for a cople  of months+)        CC: Patient is here for pruritic rash of the left breast area and chest wall as well as requesting referral to an allergist for environmental allergies.  She is a nurse at the infusion center    HPI       1. Environmental allergies  Patient states she has history of environmental allergies although she is not sure what she is allergic to and therefore would like to have allergy testing.  She thinks she is allergic to cats as well as certain pollens but has never been tested.  She has been treating with over-the-counter antihistamines with limited success.    2. Dermatitis  Patient reports she has noticed pruritus of her left chest wall and upper breast over the last 2 months and has not been treating the area except taking oral antihistamines.  It is worse at night when she tries to sleep.  She has not noticed any vesicles or ulcers.  She has not felt any lumps of the breast.    3. Obesity (BMI 30-39.9)  Patient continues to be obese.  Past Medical History:   Diagnosis Date   • Bronchitis 2005   • Other specified disorder of intestines     Secondary to Gluten allergy     Social History     Socioeconomic History   • Marital status: Single     Spouse name: Not on file   • Number of children: Not on file   • Years of education: Not on file   • Highest education level: Not on file   Occupational History   • Not on file   Social Needs   • Financial resource strain: Not on file   • Food insecurity     Worry: Not on file     Inability: Not on file   • Transportation needs     Medical: Not on file     Non-medical: Not on file   Tobacco Use   • Smoking status: Never Smoker   • Smokeless tobacco: Never Used   Substance and Sexual Activity   • Alcohol use: Yes     Comment: rare   • Drug use: No   • Sexual activity: Never   Lifestyle   • Physical activity     Days per week: Not on file      "Minutes per session: Not on file   • Stress: Not on file   Relationships   • Social connections     Talks on phone: Not on file     Gets together: Not on file     Attends Baptism service: Not on file     Active member of club or organization: Not on file     Attends meetings of clubs or organizations: Not on file     Relationship status: Not on file   • Intimate partner violence     Fear of current or ex partner: Not on file     Emotionally abused: Not on file     Physically abused: Not on file     Forced sexual activity: Not on file   Other Topics Concern   • Not on file   Social History Narrative   • Not on file     Current Outpatient Medications   Medication Sig Dispense Refill   • triamcinolone acetonide (KENALOG) 0.1 % Cream Apply 1 Application topically 2 times a day. 30 g 1     No current facility-administered medications for this visit.      Family History   Problem Relation Age of Onset   • No Known Problems Mother    • Kidney Disease Father    • Arthritis Father    • Alcohol/Drug Father          Review of Systems   Skin: Positive for itching and rash.   Endo/Heme/Allergies: Positive for environmental allergies.   All other systems reviewed and are negative.         Objective:     /76 (BP Location: Right arm, Patient Position: Sitting, BP Cuff Size: Adult)   Pulse 84   Temp 36.4 °C (97.5 °F) (Temporal)   Resp 16   Ht 1.676 m (5' 6\")   Wt 106.6 kg (235 lb)   SpO2 94%   BMI 37.93 kg/m²      Physical Exam  Vitals signs and nursing note reviewed.   Constitutional:       General: She is not in acute distress.     Appearance: She is well-developed. She is not diaphoretic.   HENT:      Head: Normocephalic and atraumatic.      Right Ear: External ear normal.      Left Ear: External ear normal.      Nose: Nose normal.   Eyes:      General:         Right eye: No discharge.         Left eye: No discharge.   Neck:      Musculoskeletal: Normal range of motion and neck supple.      Thyroid: No thyromegaly. "   Cardiovascular:      Rate and Rhythm: Normal rate and regular rhythm.      Heart sounds: Normal heart sounds. No murmur. No friction rub. No gallop.    Pulmonary:      Effort: Pulmonary effort is normal.      Breath sounds: Normal breath sounds. No wheezing or rales.   Musculoskeletal:         General: No tenderness.   Skin:     General: Skin is warm and dry.      Findings: No rash.      Comments: No obvious rash noted of the left chest area.  There are some scratch marks but otherwise it appears to slightly dry.  Chaperone present during exam.   Neurological:      Mental Status: She is alert and oriented to person, place, and time.      Deep Tendon Reflexes: Reflexes normal.   Psychiatric:         Behavior: Behavior normal.         Thought Content: Thought content normal.         Judgment: Judgment normal.                 Assessment/Plan:        1. Environmental allergies  Patient may continue with over-the-counter antihistamine such as Xyzal or Zyrtec and I will refer her to an allergist as she requested to see about the possibility of allergy testing.  - REFERRAL TO ALLERGY      2. Dermatitis  Patient advised to use moisturizers to her skin and continue with her antihistamines.  I will have her try triamcinolone short-term to the area as well until she can see the allergist.  She is to report to me if she develops any nodular areas of the breast or breast pain.    3. Obesity (BMI 30-39.9)    - Patient identified as having weight management issue.  Appropriate orders and counseling given.

## 2021-01-29 PROCEDURE — 0012A MODERNA SARS-COV-2 VACCINE: CPT

## 2021-01-29 PROCEDURE — 91301 MODERNA SARS-COV-2 VACCINE: CPT

## 2021-01-30 ENCOUNTER — IMMUNIZATION (OUTPATIENT)
Dept: FAMILY PLANNING/WOMEN'S HEALTH CLINIC | Facility: IMMUNIZATION CENTER | Age: 26
End: 2021-01-30
Payer: COMMERCIAL

## 2021-01-30 DIAGNOSIS — Z23 ENCOUNTER FOR VACCINATION: Primary | ICD-10-CM

## 2021-03-22 ENCOUNTER — PHARMACY VISIT (OUTPATIENT)
Dept: PHARMACY | Facility: MEDICAL CENTER | Age: 26
End: 2021-03-22
Payer: COMMERCIAL

## 2021-03-22 PROCEDURE — RXMED WILLOW AMBULATORY MEDICATION CHARGE: Performed by: PHYSICIAN ASSISTANT

## 2021-03-25 ENCOUNTER — EH NON-PROVIDER (OUTPATIENT)
Dept: OCCUPATIONAL MEDICINE | Facility: CLINIC | Age: 26
End: 2021-03-25

## 2021-03-25 DIAGNOSIS — Z01.89 RESPIRATORY CLEARANCE EXAMINATION, ENCOUNTER FOR: ICD-10-CM

## 2021-03-25 PROCEDURE — 94375 RESPIRATORY FLOW VOLUME LOOP: CPT | Performed by: PREVENTIVE MEDICINE

## 2021-04-23 ENCOUNTER — PHARMACY VISIT (OUTPATIENT)
Dept: PHARMACY | Facility: MEDICAL CENTER | Age: 26
End: 2021-04-23
Payer: COMMERCIAL

## 2021-04-23 PROCEDURE — RXMED WILLOW AMBULATORY MEDICATION CHARGE: Performed by: PHYSICIAN ASSISTANT

## 2021-05-26 ENCOUNTER — PHARMACY VISIT (OUTPATIENT)
Dept: PHARMACY | Facility: MEDICAL CENTER | Age: 26
End: 2021-05-26
Payer: COMMERCIAL

## 2021-05-26 PROCEDURE — RXMED WILLOW AMBULATORY MEDICATION CHARGE: Performed by: PHYSICIAN ASSISTANT

## 2021-09-21 ENCOUNTER — IMMUNIZATION (OUTPATIENT)
Dept: OCCUPATIONAL MEDICINE | Facility: CLINIC | Age: 26
End: 2021-09-21
Payer: COMMERCIAL

## 2021-09-21 DIAGNOSIS — Z23 NEED FOR VACCINATION: Primary | ICD-10-CM

## 2021-09-21 PROCEDURE — 90686 IIV4 VACC NO PRSV 0.5 ML IM: CPT | Performed by: PREVENTIVE MEDICINE

## 2021-12-15 PROCEDURE — RXMED WILLOW AMBULATORY MEDICATION CHARGE: Performed by: PHYSICIAN ASSISTANT

## 2021-12-17 ENCOUNTER — PHARMACY VISIT (OUTPATIENT)
Dept: PHARMACY | Facility: MEDICAL CENTER | Age: 26
End: 2021-12-17
Payer: COMMERCIAL

## 2021-12-17 PROCEDURE — RXMED WILLOW AMBULATORY MEDICATION CHARGE: Performed by: INTERNAL MEDICINE

## 2021-12-17 RX ORDER — CX-024414 0.2 MG/ML
INJECTION, SUSPENSION INTRAMUSCULAR
Qty: 0.25 ML | Refills: 0 | Status: SHIPPED | OUTPATIENT
Start: 2021-12-17 | End: 2022-03-22

## 2022-02-17 ENCOUNTER — OUTPATIENT INFUSION SERVICES (OUTPATIENT)
Dept: ONCOLOGY | Facility: MEDICAL CENTER | Age: 27
End: 2022-02-17
Attending: FAMILY MEDICINE
Payer: COMMERCIAL

## 2022-02-17 ENCOUNTER — PHARMACY VISIT (OUTPATIENT)
Dept: PHARMACY | Facility: MEDICAL CENTER | Age: 27
End: 2022-02-17
Payer: COMMERCIAL

## 2022-02-17 DIAGNOSIS — N93.9 VAGINAL HEMORRHAGE: ICD-10-CM

## 2022-02-17 LAB
BASOPHILS # BLD AUTO: 0.5 % (ref 0–1.8)
BASOPHILS # BLD: 0.06 K/UL (ref 0–0.12)
EOSINOPHIL # BLD AUTO: 0.19 K/UL (ref 0–0.51)
EOSINOPHIL NFR BLD: 1.7 % (ref 0–6.9)
ERYTHROCYTE [DISTWIDTH] IN BLOOD BY AUTOMATED COUNT: 38 FL (ref 35.9–50)
HCT VFR BLD AUTO: 42.1 % (ref 37–47)
HGB BLD-MCNC: 14.3 G/DL (ref 12–16)
IMM GRANULOCYTES # BLD AUTO: 0.03 K/UL (ref 0–0.11)
IMM GRANULOCYTES NFR BLD AUTO: 0.3 % (ref 0–0.9)
LYMPHOCYTES # BLD AUTO: 4.02 K/UL (ref 1–4.8)
LYMPHOCYTES NFR BLD: 36 % (ref 22–41)
MCH RBC QN AUTO: 27.6 PG (ref 27–33)
MCHC RBC AUTO-ENTMCNC: 34 G/DL (ref 33.6–35)
MCV RBC AUTO: 81.3 FL (ref 81.4–97.8)
MONOCYTES # BLD AUTO: 0.6 K/UL (ref 0–0.85)
MONOCYTES NFR BLD AUTO: 5.4 % (ref 0–13.4)
NEUTROPHILS # BLD AUTO: 6.27 K/UL (ref 2–7.15)
NEUTROPHILS NFR BLD: 56.1 % (ref 44–72)
NRBC # BLD AUTO: 0 K/UL
NRBC BLD-RTO: 0 /100 WBC
PLATELET # BLD AUTO: 310 K/UL (ref 164–446)
PMV BLD AUTO: 10.2 FL (ref 9–12.9)
RBC # BLD AUTO: 5.18 M/UL (ref 4.2–5.4)
TSH SERPL DL<=0.005 MIU/L-ACNC: 1.61 UIU/ML (ref 0.38–5.33)
WBC # BLD AUTO: 11.2 K/UL (ref 4.8–10.8)

## 2022-02-17 PROCEDURE — 84443 ASSAY THYROID STIM HORMONE: CPT

## 2022-02-17 PROCEDURE — 85240 CLOT FACTOR VIII AHG 1 STAGE: CPT

## 2022-02-17 PROCEDURE — 36415 COLL VENOUS BLD VENIPUNCTURE: CPT

## 2022-02-17 PROCEDURE — 85025 COMPLETE CBC W/AUTO DIFF WBC: CPT

## 2022-02-17 PROCEDURE — 85245 CLOT FACTOR VIII VW RISTOCTN: CPT

## 2022-02-17 PROCEDURE — RXMED WILLOW AMBULATORY MEDICATION CHARGE: Performed by: OBSTETRICS & GYNECOLOGY

## 2022-02-17 PROCEDURE — 85246 CLOT FACTOR VIII VW ANTIGEN: CPT

## 2022-02-17 RX ORDER — MEDROXYPROGESTERONE ACETATE 10 MG/1
TABLET ORAL
Qty: 10 TABLET | Refills: 0 | Status: SHIPPED | OUTPATIENT
Start: 2022-02-17 | End: 2022-03-22

## 2022-02-18 NOTE — PROGRESS NOTES
Patient arrived to [facility] for lab draw.  Labs drawn from left a/c, gauze/coban placed. Tolerated well.  Patient ambulated out of clinic in no apparent distress.

## 2022-02-23 ENCOUNTER — HOSPITAL ENCOUNTER (OUTPATIENT)
Dept: RADIOLOGY | Facility: MEDICAL CENTER | Age: 27
End: 2022-02-23
Attending: OBSTETRICS & GYNECOLOGY
Payer: COMMERCIAL

## 2022-02-23 DIAGNOSIS — N93.9 VAGINAL HEMORRHAGE: ICD-10-CM

## 2022-02-23 PROCEDURE — 76830 TRANSVAGINAL US NON-OB: CPT

## 2022-02-25 LAB
FACT VIII ACT/NOR PPP: 97 % (ref 56–191)
VWF AG ACT/NOR PPP IA: 78 % (ref 52–214)
VWF:RCO ACT/NOR PPP PL AGG: 65 % (ref 51–215)

## 2022-02-25 PROCEDURE — RXMED WILLOW AMBULATORY MEDICATION CHARGE: Performed by: OBSTETRICS & GYNECOLOGY

## 2022-02-28 ENCOUNTER — PHARMACY VISIT (OUTPATIENT)
Dept: PHARMACY | Facility: MEDICAL CENTER | Age: 27
End: 2022-02-28
Payer: COMMERCIAL

## 2022-03-22 ENCOUNTER — HOSPITAL ENCOUNTER (OUTPATIENT)
Dept: LAB | Facility: MEDICAL CENTER | Age: 27
End: 2022-03-22
Attending: FAMILY MEDICINE
Payer: COMMERCIAL

## 2022-03-22 ENCOUNTER — OFFICE VISIT (OUTPATIENT)
Dept: MEDICAL GROUP | Facility: LAB | Age: 27
End: 2022-03-22
Payer: COMMERCIAL

## 2022-03-22 VITALS
SYSTOLIC BLOOD PRESSURE: 126 MMHG | WEIGHT: 238 LBS | OXYGEN SATURATION: 97 % | HEIGHT: 66 IN | RESPIRATION RATE: 16 BRPM | DIASTOLIC BLOOD PRESSURE: 86 MMHG | HEART RATE: 78 BPM | TEMPERATURE: 97.5 F | BODY MASS INDEX: 38.25 KG/M2

## 2022-03-22 DIAGNOSIS — N92.6 IRREGULAR MENSTRUAL BLEEDING: ICD-10-CM

## 2022-03-22 DIAGNOSIS — R53.83 OTHER FATIGUE: ICD-10-CM

## 2022-03-22 LAB
BASOPHILS # BLD AUTO: 0.6 % (ref 0–1.8)
BASOPHILS # BLD: 0.05 K/UL (ref 0–0.12)
EOSINOPHIL # BLD AUTO: 0.16 K/UL (ref 0–0.51)
EOSINOPHIL NFR BLD: 1.9 % (ref 0–6.9)
ERYTHROCYTE [DISTWIDTH] IN BLOOD BY AUTOMATED COUNT: 40.8 FL (ref 35.9–50)
FERRITIN SERPL-MCNC: 45.3 NG/ML (ref 10–291)
HCT VFR BLD AUTO: 43.5 % (ref 37–47)
HGB BLD-MCNC: 14.1 G/DL (ref 12–16)
IMM GRANULOCYTES # BLD AUTO: 0.02 K/UL (ref 0–0.11)
IMM GRANULOCYTES NFR BLD AUTO: 0.2 % (ref 0–0.9)
IRON SATN MFR SERPL: 11 % (ref 15–55)
IRON SERPL-MCNC: 52 UG/DL (ref 40–170)
LYMPHOCYTES # BLD AUTO: 3.26 K/UL (ref 1–4.8)
LYMPHOCYTES NFR BLD: 38.4 % (ref 22–41)
MCH RBC QN AUTO: 27 PG (ref 27–33)
MCHC RBC AUTO-ENTMCNC: 32.4 G/DL (ref 33.6–35)
MCV RBC AUTO: 83.2 FL (ref 81.4–97.8)
MONOCYTES # BLD AUTO: 0.39 K/UL (ref 0–0.85)
MONOCYTES NFR BLD AUTO: 4.6 % (ref 0–13.4)
NEUTROPHILS # BLD AUTO: 4.6 K/UL (ref 2–7.15)
NEUTROPHILS NFR BLD: 54.3 % (ref 44–72)
NRBC # BLD AUTO: 0 K/UL
NRBC BLD-RTO: 0 /100 WBC
PLATELET # BLD AUTO: 295 K/UL (ref 164–446)
PMV BLD AUTO: 11.2 FL (ref 9–12.9)
RBC # BLD AUTO: 5.23 M/UL (ref 4.2–5.4)
TIBC SERPL-MCNC: 458 UG/DL (ref 250–450)
UIBC SERPL-MCNC: 406 UG/DL (ref 110–370)
WBC # BLD AUTO: 8.5 K/UL (ref 4.8–10.8)

## 2022-03-22 PROCEDURE — 83550 IRON BINDING TEST: CPT

## 2022-03-22 PROCEDURE — 36415 COLL VENOUS BLD VENIPUNCTURE: CPT

## 2022-03-22 PROCEDURE — 99203 OFFICE O/P NEW LOW 30 MIN: CPT | Performed by: FAMILY MEDICINE

## 2022-03-22 PROCEDURE — 85025 COMPLETE CBC W/AUTO DIFF WBC: CPT

## 2022-03-22 PROCEDURE — 82728 ASSAY OF FERRITIN: CPT

## 2022-03-22 PROCEDURE — 83540 ASSAY OF IRON: CPT

## 2022-03-22 ASSESSMENT — PATIENT HEALTH QUESTIONNAIRE - PHQ9: CLINICAL INTERPRETATION OF PHQ2 SCORE: 0

## 2022-03-22 NOTE — PROGRESS NOTES
Dian Tinsley is a 26 y.o. female here to establish care and discuss irregular menstrual bleeding and fatigue.    Has been following at OB/GYN office for about 2 years for intermittent heavy and near constant menstrual bleeding.  Initial episode of this about 2 years ago that resolved with oral progesterone.  Did well until about 3 months ago.  Irregular and near constant bleeding up until few weeks ago.  Did progesterone without improvement, currently on high dose OCP and stopped bleeding.  Had Pelvic US and thyroid, vWF testing  Still very fatigued  Has follow-up scheduled with nurse practitioner at [facility] and they do plan to refer her to physician there if this is not continue to be well controlled.    Current medicines (including changes today)  Current Outpatient Medications   Medication Sig Dispense Refill   • norgestimate-ethinyl estradiol (ORTHO-CYCLEN) 0.25-35 MG-MCG per tablet 1 tablet Orally Once a day 84 day(s) 84 Tablet 0   • montelukast (SINGULAIR) 10 MG Tab Take 1 Tablet by mouth at bedtime. 90 Tablet 1   • pimecrolimus (ELIDEL) 1 % cream Apply sparingly to affected area 2 times a day as needed for itching. 60 g 2   • medroxyPROGESTERone (PROVERA) 10 MG Tab Take 1 tablet orally once a day for 10 days 10 Tablet 0   • COVID-19 mRNA vaccine, Moderna, (MODERNA COVID-19 VACCINE) 100 MCG/0.5ML Suspension injection Inject  into the shoulder, thigh, or buttocks. 0.25 mL 0   • montelukast (SINGULAIR) 10 MG Tab Take 1 tablet by mouth every night before bed. 90 tablet 1   • montelukast (SINGULAIR) 10 MG Tab Take 1 tablet orally nightly before bed. 30 tablet 2   • triamcinolone acetonide (KENALOG) 0.1 % Cream Apply 1 Application topically 2 times a day. 30 g 1     No current facility-administered medications for this visit.     She  has a past medical history of Bronchitis (2005) and Other specified disorder of intestines.    She has no past medical history of Pain.  She  has a past surgical  "history that includes other (1997); other (2012); and tonsillectomy (12/13/2013).  Social History     Tobacco Use   • Smoking status: Never Smoker   • Smokeless tobacco: Never Used   Vaping Use   • Vaping Use: Never used   Substance Use Topics   • Alcohol use: Yes     Comment: rare   • Drug use: No     Social History     Social History Narrative   • Not on file     Family History   Problem Relation Age of Onset   • No Known Problems Mother    • Kidney Disease Father    • Arthritis Father    • Alcohol/Drug Father      Family Status   Relation Name Status   • Mo  Alive   • Fa  Alive       ROS  See HPI     Objective:     Physical Exam:  /86 (BP Location: Left arm, Patient Position: Sitting, BP Cuff Size: Adult)   Pulse 78   Temp 36.4 °C (97.5 °F)   Resp 16   Ht 1.676 m (5' 6\")   Wt 108 kg (238 lb)   SpO2 97%  Body mass index is 38.41 kg/m².  Constitutional: Alert. Well appearing. No distress.  Skin: Warm, dry, good turgor, no visible rashes.  Eye: Equal, round and reactive to light, conjunctiva clear, lids normal.  ENMT: Moist mucous membranes.   Neck: Trachea midline, no masses, no thyromegaly.  Respiratory: Normal effort. Lungs are clear to auscultation bilaterally.  Cardiovascular: Regular rate and rhythm. Normal S1/S2. No murmurs, rubs or gallops.   Neuro: Moves all four extremities. No facial droop.  Psych: Answers questions appropriately. Normal affect and mood.    Assessment and Plan:     1. Irregular menstrual bleeding  Repeated episodes of heavy and irregular menstrual bleeding, currently well controlled after she was started on Ortho-Cyclen and she is following at OB/GYN office.  Continued fatigue as below and will check CBC and iron levels.  - CBC WITH DIFFERENTIAL; Future  - IRON/TOTAL IRON BIND; Future  - FERRITIN; Future    2. Other fatigue  - CBC WITH DIFFERENTIAL; Future  - IRON/TOTAL IRON BIND; Future  - FERRITIN; Future           PLEASE NOTE: This note was created using voice recognition " software.

## 2022-03-22 NOTE — LETTER
[facility]  Ankur Noel M.D.  [address]  [place] [address]  Fax: [phone]   Authorization for Release/Disclosure of   Protected Health Information   Name: GILLIAN AMBROCIO : 1995 SSN: [id number]   Address: [address] [place] [address]  [place] [address] Phone:    [phone] (home) [phone] (work)   I authorize the entity listed below to release/disclose the PHI below to:   [facility]/Ankur Noel M.D. and Ankur Noel M.D.   Provider or Entity Name:  [facility] JAVIER Monsivais    Address   City, State, [facility]   Phone:      Fax:     Reason for request: continuity of care   Information to be released:    [  ] LAST COLONOSCOPY,  including any PATH REPORT and follow-up  [  ] LAST FIT/COLOGUARD RESULT [  ] LAST DEXA  [  ] LAST MAMMOGRAM  [  ] LAST PAP  [  ] LAST LABS [  ] RETINA EXAM REPORT  [  ] IMMUNIZATION RECORDS  [XXXXX] Release all info      [XXXXX] Check here and initial the line next to each item to release ALL health information INCLUDING  _XXXXX_ Care and treatment for drug and / or alcohol abuse  _XXXXX_ HIV testing, infection status, or AIDS  _XXXXX_ Genetic Testing    DATES OF SERVICE OR TIME PERIOD TO BE DISCLOSED: _____________  I understand and acknowledge that:  * This Authorization may be revoked at any time by you in writing, except if your health information has already been used or disclosed.  * Your health information that will be used or disclosed as a result of you signing this authorization could be re-disclosed by the recipient. If this occurs, your re-disclosed health information may no longer be protected by State or Federal laws.  * You may refuse to sign this Authorization. Your refusal will not affect your ability to obtain treatment.  * This Authorization becomes effective upon signing and will  on (date) __________.      If no date is indicated, this Authorization will  one (1) year from the  signature date.    Name: Dian Tinsley    Signature:   Date:     3/22/2022       PLEASE FAX REQUESTED RECORDS BACK TO: [phone]

## 2022-05-02 PROCEDURE — RXMED WILLOW AMBULATORY MEDICATION CHARGE: Performed by: INTERNAL MEDICINE

## 2022-05-05 ENCOUNTER — PHARMACY VISIT (OUTPATIENT)
Dept: PHARMACY | Facility: MEDICAL CENTER | Age: 27
End: 2022-05-05
Payer: COMMERCIAL

## 2022-08-25 ENCOUNTER — PHARMACY VISIT (OUTPATIENT)
Dept: PHARMACY | Facility: MEDICAL CENTER | Age: 27
End: 2022-08-25
Payer: COMMERCIAL

## 2022-08-25 PROCEDURE — RXMED WILLOW AMBULATORY MEDICATION CHARGE: Performed by: INTERNAL MEDICINE

## 2022-08-25 RX ORDER — AMOXICILLIN 250 MG/1
CAPSULE ORAL
Qty: 9 CAPSULE | Refills: 0 | Status: SHIPPED | OUTPATIENT
Start: 2022-08-25 | End: 2023-06-01

## 2022-09-20 ENCOUNTER — PHARMACY VISIT (OUTPATIENT)
Dept: PHARMACY | Facility: MEDICAL CENTER | Age: 27
End: 2022-09-20
Payer: COMMERCIAL

## 2022-09-20 PROCEDURE — RXMED WILLOW AMBULATORY MEDICATION CHARGE: Performed by: OBSTETRICS & GYNECOLOGY

## 2022-09-20 RX ORDER — NORETHINDRONE ACETATE AND ETHINYL ESTRADIOL 1MG-20(21)
KIT ORAL
Qty: 84 TABLET | Refills: 2 | Status: SHIPPED | OUTPATIENT
Start: 2022-09-20 | End: 2023-06-01

## 2022-10-11 ENCOUNTER — IMMUNIZATION (OUTPATIENT)
Dept: OCCUPATIONAL MEDICINE | Facility: CLINIC | Age: 27
End: 2022-10-11

## 2022-10-11 DIAGNOSIS — Z23 NEED FOR VACCINATION: Primary | ICD-10-CM

## 2022-10-11 PROCEDURE — 90686 IIV4 VACC NO PRSV 0.5 ML IM: CPT | Performed by: NURSE PRACTITIONER

## 2022-12-16 PROCEDURE — RXMED WILLOW AMBULATORY MEDICATION CHARGE: Performed by: OBSTETRICS & GYNECOLOGY

## 2022-12-22 ENCOUNTER — PHARMACY VISIT (OUTPATIENT)
Dept: PHARMACY | Facility: MEDICAL CENTER | Age: 27
End: 2022-12-22
Payer: COMMERCIAL

## 2023-01-23 ENCOUNTER — PHARMACY VISIT (OUTPATIENT)
Dept: PHARMACY | Facility: MEDICAL CENTER | Age: 28
End: 2023-01-23
Payer: COMMERCIAL

## 2023-01-23 PROCEDURE — RXMED WILLOW AMBULATORY MEDICATION CHARGE: Performed by: INTERNAL MEDICINE

## 2023-05-30 ENCOUNTER — PHARMACY VISIT (OUTPATIENT)
Dept: PHARMACY | Facility: MEDICAL CENTER | Age: 28
End: 2023-05-30
Payer: COMMERCIAL

## 2023-05-30 PROCEDURE — RXMED WILLOW AMBULATORY MEDICATION CHARGE: Performed by: INTERNAL MEDICINE

## 2023-05-31 PROCEDURE — RXMED WILLOW AMBULATORY MEDICATION CHARGE: Performed by: INTERNAL MEDICINE

## 2023-06-01 ENCOUNTER — PHARMACY VISIT (OUTPATIENT)
Dept: PHARMACY | Facility: MEDICAL CENTER | Age: 28
End: 2023-06-01
Payer: COMMERCIAL

## 2023-06-01 ENCOUNTER — OUTPATIENT INFUSION SERVICES (OUTPATIENT)
Dept: ONCOLOGY | Facility: MEDICAL CENTER | Age: 28
End: 2023-06-01
Attending: INTERNAL MEDICINE
Payer: COMMERCIAL

## 2023-06-01 DIAGNOSIS — J30.89 NON-SEASONAL ALLERGIC RHINITIS, UNSPECIFIED TRIGGER: ICD-10-CM

## 2023-06-01 LAB
BASOPHILS # BLD AUTO: 0.7 % (ref 0–1.8)
BASOPHILS # BLD: 0.07 K/UL (ref 0–0.12)
EOSINOPHIL # BLD AUTO: 0.28 K/UL (ref 0–0.51)
EOSINOPHIL NFR BLD: 2.6 % (ref 0–6.9)
ERYTHROCYTE [DISTWIDTH] IN BLOOD BY AUTOMATED COUNT: 38.4 FL (ref 35.9–50)
HCT VFR BLD AUTO: 43.2 % (ref 37–47)
HGB BLD-MCNC: 14.3 G/DL (ref 12–16)
IMM GRANULOCYTES # BLD AUTO: 0.03 K/UL (ref 0–0.11)
IMM GRANULOCYTES NFR BLD AUTO: 0.3 % (ref 0–0.9)
LYMPHOCYTES # BLD AUTO: 3.97 K/UL (ref 1–4.8)
LYMPHOCYTES NFR BLD: 37.5 % (ref 22–41)
MCH RBC QN AUTO: 27.7 PG (ref 27–33)
MCHC RBC AUTO-ENTMCNC: 33.1 G/DL (ref 32.2–35.5)
MCV RBC AUTO: 83.7 FL (ref 81.4–97.8)
MONOCYTES # BLD AUTO: 0.57 K/UL (ref 0–0.85)
MONOCYTES NFR BLD AUTO: 5.4 % (ref 0–13.4)
NEUTROPHILS # BLD AUTO: 5.66 K/UL (ref 1.82–7.42)
NEUTROPHILS NFR BLD: 53.5 % (ref 44–72)
NRBC # BLD AUTO: 0 K/UL
NRBC BLD-RTO: 0 /100 WBC (ref 0–0.2)
OUTPT INFUS CBC COMMENT OICOM: NORMAL
PLATELET # BLD AUTO: 326 K/UL (ref 164–446)
PMV BLD AUTO: 10.4 FL (ref 9–12.9)
RBC # BLD AUTO: 5.16 M/UL (ref 4.2–5.4)
WBC # BLD AUTO: 10.6 K/UL (ref 4.8–10.8)

## 2023-06-01 PROCEDURE — 82785 ASSAY OF IGE: CPT

## 2023-06-01 PROCEDURE — 36415 COLL VENOUS BLD VENIPUNCTURE: CPT

## 2023-06-01 PROCEDURE — 86003 ALLG SPEC IGE CRUDE XTRC EA: CPT | Mod: 91

## 2023-06-01 PROCEDURE — 85025 COMPLETE CBC W/AUTO DIFF WBC: CPT

## 2023-06-02 NOTE — PROGRESS NOTES
Pt presented to infusion for lab draw. Paper order entered and noted, labs drawn as ordered from [facility] with 23G butterfly, gauze drsg placed. No further appts needed.

## 2023-06-03 LAB
A ALTERNATA IGE QN: <0.1 KU/L
A FUMIGATUS IGE QN: <0.1 KU/L
BERMUDA GRASS IGE QN: <0.1 KU/L
BOXELDER IGE QN: <0.1 KU/L
C SPHAEROSPERMUM IGE QN: <0.1 KU/L
CAT DANDER IGE QN: 2.77 KU/L
CMN PIGWEED IGE QN: <0.1 KU/L
COMMON RAGWEED IGE QN: <0.1 KU/L
COTTONWOOD IGE QN: <0.1 KU/L
D FARINAE IGE QN: <0.1 KU/L
D PTERONYSS IGE QN: <0.1 KU/L
DEPRECATED MISC ALLERGEN IGE RAST QL: NORMAL
DOG DANDER IGE QN: 1 KU/L
IGE SERPL-ACNC: 33 KU/L
M RACEMOSUS IGE QN: <0.1 KU/L
MOUSE EPITH IGE QN: <0.1 KU/L
MT JUNIPER IGE QN: 1.95 KU/L
MUGWORT IGE QN: <0.1 KU/L
OLIVE POLN IGE QN: <0.1 KU/L
P NOTATUM IGE QN: <0.1 KU/L
ROACH IGE QN: <0.1 KU/L
SALTWORT IGE QN: <0.1 KU/L
TIMOTHY IGE QN: <0.1 KU/L
WHITE ELM IGE QN: 0.1 KU/L
WHITE MULBERRY IGE QN: <0.1 KU/L
WHITE OAK IGE QN: <0.1 KU/L

## 2023-08-08 ENCOUNTER — PHARMACY VISIT (OUTPATIENT)
Dept: PHARMACY | Facility: MEDICAL CENTER | Age: 28
End: 2023-08-08
Payer: COMMERCIAL

## 2023-08-08 PROCEDURE — RXMED WILLOW AMBULATORY MEDICATION CHARGE: Performed by: INTERNAL MEDICINE

## 2023-08-09 ENCOUNTER — OFFICE VISIT (OUTPATIENT)
Dept: MEDICAL GROUP | Facility: LAB | Age: 28
End: 2023-08-09
Payer: COMMERCIAL

## 2023-08-09 VITALS
OXYGEN SATURATION: 99 % | DIASTOLIC BLOOD PRESSURE: 78 MMHG | TEMPERATURE: 97.5 F | RESPIRATION RATE: 12 BRPM | SYSTOLIC BLOOD PRESSURE: 110 MMHG | BODY MASS INDEX: 38.57 KG/M2 | WEIGHT: 240 LBS | HEIGHT: 66 IN | HEART RATE: 78 BPM

## 2023-08-09 DIAGNOSIS — K90.41 GLUTEN INTOLERANCE: ICD-10-CM

## 2023-08-09 DIAGNOSIS — N62 LARGE BREASTS: ICD-10-CM

## 2023-08-09 PROCEDURE — 3078F DIAST BP <80 MM HG: CPT | Performed by: FAMILY MEDICINE

## 2023-08-09 PROCEDURE — 3074F SYST BP LT 130 MM HG: CPT | Performed by: FAMILY MEDICINE

## 2023-08-09 PROCEDURE — 99213 OFFICE O/P EST LOW 20 MIN: CPT | Performed by: FAMILY MEDICINE

## 2023-08-09 ASSESSMENT — PATIENT HEALTH QUESTIONNAIRE - PHQ9: CLINICAL INTERPRETATION OF PHQ2 SCORE: 0

## 2023-08-09 NOTE — PROGRESS NOTES
"Subjective:     CC: GI Referral, breast reduction    HPI:   Dian presents today to discuss history of GI issues, cold intolerance with possible GI referral, and breast reduction.    Initially had episodes of various symptoms and about 13 years old including GI upset, joint swelling, neurologic symptoms.  A provider at the time recommended gluten-free and this did seem to improve or resolve a lot of the symptoms.  She eventually had serologic screening for celiac disease that was negative but she had been gluten-free for some time at that point.  She continues to have intermittent diarrhea and constipation along with nausea, bloating, heartburn.  Has worked on other dietary modifications including avoiding refined sugars and dairy.  She does note that she will have return of occasional severe symptoms after gluten intake including joint swelling and pain, GI upset.    She is looking into considering breast reduction with plastic surgery.  Has chronic back pain secondary to large breasts as well as skin changes from bra line compression.    Medications, past medical history, allergies, and social history have been reviewed and updated.      Objective:       Exam:  /78 (BP Location: Left arm, Patient Position: Sitting, BP Cuff Size: Adult long)   Pulse 78   Temp 36.4 °C (97.5 °F)   Resp 12   Ht 1.676 m (5' 6\")   Wt 109 kg (240 lb)   SpO2 99%   BMI 38.74 kg/m²  Body mass index is 38.74 kg/m².    Constitutional: Alert. Well appearing. No distress.  Respiratory: Normal effort. Lungs are clear to auscultation bilaterally.  Cardiovascular: Regular rate and rhythm. Normal S1/S2. No murmurs, rubs or gallops.   Neuro: Moves all four extremities. No facial droop.  Psych: Answers questions appropriately. Normal affect and mood.      Assessment & Plan:     28 y.o. female with the following -     1. Gluten intolerance  Has been avoiding gluten for years and has seen improvement in multiple symptoms but does continue " to have intermittent diarrhea and constipation along with nausea, bloating, heartburn.  More severe symptoms will return after gluten intake.  She had negative serologic testing years ago but had been gluten-free for some time prior to that so would not be sensitive.  She would like to discuss more sensitive testing for celiac disease including EGD with gastroenterology and referral was sent.  - Referral to Gastroenterology    2. Large breasts  She is considering breast reduction and can send referral if needed.  Does have multiple chronic issues secondary to large breasts including back pain and skin changes.      Please note that this note was created using voice recognition software.

## 2023-08-29 ENCOUNTER — PHARMACY VISIT (OUTPATIENT)
Dept: PHARMACY | Facility: MEDICAL CENTER | Age: 28
End: 2023-08-29
Payer: COMMERCIAL

## 2023-08-29 PROCEDURE — RXMED WILLOW AMBULATORY MEDICATION CHARGE: Performed by: INTERNAL MEDICINE

## 2023-09-05 ENCOUNTER — OCCUPATIONAL MEDICINE (OUTPATIENT)
Dept: URGENT CARE | Facility: CLINIC | Age: 28
End: 2023-09-05
Payer: COMMERCIAL

## 2023-09-05 ENCOUNTER — OUTPATIENT INFUSION SERVICES (OUTPATIENT)
Dept: ONCOLOGY | Facility: MEDICAL CENTER | Age: 28
End: 2023-09-05
Attending: PHYSICIAN ASSISTANT
Payer: COMMERCIAL

## 2023-09-05 ENCOUNTER — NON-PROVIDER VISIT (OUTPATIENT)
Dept: URGENT CARE | Facility: CLINIC | Age: 28
End: 2023-09-05

## 2023-09-05 VITALS
WEIGHT: 240 LBS | BODY MASS INDEX: 38.57 KG/M2 | OXYGEN SATURATION: 98 % | HEIGHT: 66 IN | HEART RATE: 82 BPM | RESPIRATION RATE: 16 BRPM | SYSTOLIC BLOOD PRESSURE: 122 MMHG | DIASTOLIC BLOOD PRESSURE: 74 MMHG | TEMPERATURE: 97.4 F

## 2023-09-05 DIAGNOSIS — Z02.1 PRE-EMPLOYMENT DRUG SCREENING: ICD-10-CM
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
## 2023-09-05 DIAGNOSIS — Z02.83 ENCOUNTER FOR DRUG SCREENING: Primary | ICD-10-CM

## 2023-09-05 LAB
AMP AMPHETAMINE: NORMAL
BAR BARBITURATES: NORMAL
BREATH ALCOHOL COMMENT: NORMAL
BZO BENZODIAZEPINES: NORMAL
COC COCAINE: NORMAL
HBV CORE AB SERPL QL IA: NONREACTIVE
HBV SURFACE AB SERPL IA-ACNC: <3.5 MIU/ML (ref 0–10)
HBV SURFACE AG SER QL: NORMAL
HCV AB SER QL: NORMAL
HIV 1+2 AB+HIV1 P24 AG SERPL QL IA: NORMAL
INT CON NEG: NORMAL
INT CON POS: NORMAL
MDMA ECSTASY: NORMAL
MET METHAMPHETAMINES: NORMAL
MTD METHADONE: NORMAL
OPI OPIATES: NORMAL
OXY OXYCODONE: NORMAL
PCP PHENCYCLIDINE: NORMAL
POC BREATHALIZER: 0 PERCENT (ref 0–0.01)
POC URINE DRUG SCREEN OCDRS: NEGATIVE
THC: NORMAL

## 2023-09-05 PROCEDURE — 3078F DIAST BP <80 MM HG: CPT | Performed by: PHYSICIAN ASSISTANT

## 2023-09-05 PROCEDURE — 80305 DRUG TEST PRSMV DIR OPT OBS: CPT | Performed by: PHYSICIAN ASSISTANT

## 2023-09-05 PROCEDURE — 86706 HEP B SURFACE ANTIBODY: CPT

## 2023-09-05 PROCEDURE — 3074F SYST BP LT 130 MM HG: CPT | Performed by: PHYSICIAN ASSISTANT

## 2023-09-05 PROCEDURE — 82075 ASSAY OF BREATH ETHANOL: CPT | Performed by: PHYSICIAN ASSISTANT

## 2023-09-05 PROCEDURE — 86704 HEP B CORE ANTIBODY TOTAL: CPT

## 2023-09-05 PROCEDURE — 99213 OFFICE O/P EST LOW 20 MIN: CPT | Performed by: PHYSICIAN ASSISTANT

## 2023-09-05 PROCEDURE — 87340 HEPATITIS B SURFACE AG IA: CPT

## 2023-09-05 PROCEDURE — 36415 COLL VENOUS BLD VENIPUNCTURE: CPT

## 2023-09-05 PROCEDURE — 87389 HIV-1 AG W/HIV-1&-2 AB AG IA: CPT

## 2023-09-05 PROCEDURE — 86803 HEPATITIS C AB TEST: CPT

## 2023-09-05 NOTE — LETTER
"EMPLOYEE’S CLAIM FOR COMPENSATION/ REPORT OF INITIAL TREATMENT  FORM C-4  PLEASE TYPE OR PRINT    EMPLOYEE’S CLAIM - PROVIDE ALL INFORMATION REQUESTED   First Name  Dian Last Name  Laureano Birthdate                    1995                Sex  female Claim Number (Insurer’s Use Only)   Home Address  [address] Age  28 y.o. Height  1.676 m (5' 6\") Weight  109 kg (240 lb) [facility]     [facility] Zip  [address] Telephone  [phone] (home) [phone] (work)   Mailing Address  [address]   [address] [facility] Zip  [address] Primary Language Spoken  English    INSURER   THIRD-PARTY     Esis   Employee's Occupation (Job Title) When Injury or Occupational Disease Occurred  RN    Employer's Name/Company Name  [employer]  Telephone  [phone]    Office Mail Address (Number and Street)  [address]        Date of Injury  9/5/2023               Hours Injury  8:00 AM Date Employer Notified  9/5/2023 Last Day of Work after Injury or Occupational Disease  9/5/2023 Supervisor to Whom Injury     Reported  LEE ANN SR   Address or Location of Accident (if applicable)  Work [1]   What were you doing at the time of accident? (if applicable)  ACESSING PT PORT    How did this injury or occupational disease occur? (Be specific and answer in detail. Use additional sheet if necessary)  ALOW PROFILE HUNER NEEDLE SAFETY DID NOT CLICK INITIATED  SAFETY & POKED ON (L) POINTER FINGER   If you believe that you have an occupational disease, when did you first have knowledge of the disability and its relationship to your employment?  NA Witnesses to the Accident (if applicable)  POETH K      Nature of Injury or Occupational Disease  Workers' Compensation  Part(s) of Body Injured or Affected  Finger (L), N/A, N/A    I CERTIFY THAT THE ABOVE IS TRUE AND CORRECT TO T HE BEST OF MY KNOWLEDGE AND THAT I " HAVE PROVIDED THIS INFORMATION IN ORDER TO OBTAIN THE BENEFITS OF [place], OR [place]).  I HEREBY AUTHORIZE ANY PHYSICIAN, CHIROPRACTOR, SURGEON, PRACTITIONER OR ANY OTHER PERSON, ANY HOSPITAL, INCLUDING [facility] OR [facility], ANY  MEDICAL SERVICE ORGANIZATION, ANY INSURANCE COMPANY, OR OTHER INSTITUTION OR ORGANIZATION TO RELEASE TO EACH OTHER, ANY MEDICAL OR OTHER INFORMATION, INCLUDING BENEFITS PAID OR PAYABLE, PERTINENT TO THIS INJURY OR DISEASE, EXCEPT INFORMATION RELATIVE TO DIAGNOSIS, TREATMENT AND/OR COUNSELING FOR AIDS, PSYCHOLOGICAL CONDITIONS, ALCOHOL OR CONTROLLED SUBSTANCES, FOR WHICH I MUST GIVE SPECIFIC AUTHORIZATION.  A PHOTOSTAT OF THIS AUTHORIZATION SHALL BE VALID AS THE ORIGINAL.       Date 9/5/2023   [facility]  Employee’s Original or  *Electronic Signature   THIS REPORT MUST BE COMPLETED AND MAILED WITHIN 3 WORKING DAYS OF TREATMENT   Place  [facility]  Name of [facility]   Date  9/5/2023 Diagnosis and Description of Injury or Occupational Disease  (W46.1XXA) Needlestick injury accident with exposure to body fluid Is there evidence that the injured employee was under the influence of alcohol and/or another controlled substance at the time of accident?  ? No ? Yes (if yes, please explain)   Hour  10:06 AM   The encounter diagnosis was Needlestick injury accident with exposure to body fluid. No   Treatment  Full duty, baseline labs ordered today, follow-up with occupational medicine this Friday   Have you advised the patient to remain off work five days or     more?    X-Ray Findings      ? Yes Indicate dates:   From   To      From information given by the employee, together with medical evidence, can        you directly connect this injury or occupational disease as job incurred?  Yes ? No If no, is the injured employee capable of:  ? full  "duty  Yes ? modified duty      Is additional medical care by a physician indicated?  Yes If modified duty, specify any limitations / restrictions      Do you know of any previous injury or disease contributing to this condition or occupational disease?  ? Yes ? No (Explain if yes)                          No   Date  9/5/2023 Print Health Care Provider's  Name  Cam Stephens P.A.-C. I certify that the employer’s copy of  this form was delivered to the employer on:   Address  [address] [place] [address] [address] Insurer’s Use Only     [facility] Zip  [address]    Provider’s Tax ID Number  [id number] Telephone  Dept: [phone]             Health Care Provider’s Original or Electronic Signature  e-CAM Miller P.A.-C. Degree (MD,DO, DC,JUANJOSE,APRN)  JUANJOSE      * Complete and attach Release of Information (Form C-4A) when injured employee signs C-4 Form electronically  ORIGINAL - TREATING HEALTHCARE PROVIDER PAGE 2 - INSURER/TPA PAGE 3 - EMPLOYER PAGE 4 - EMPLOYEE             Form C-4 (rev.08/21)           BRIEF DESCRIPTION OF RIGHTS AND BENEFITS  (Pursuant to [place] 616[place])    Notice of Injury or Occupational Disease (Incident Report Form C-1): If an injury or occupational disease (OD) arises out of and in the course of employment, you must provide written notice to your employer as soon as practicable, but no later than 7 days after the accident or OD. Your employer shall maintain a sufficient supply of the required forms.    Claim for Compensation (Form C-4): If medical treatment is sought, the form C-4 is available at the place of initial treatment. A completed \"Claim for Compensation\" (Form C-4) must be filed within 90 days after an accident or OD. The treating physician or chiropractor must, within 3 working days after treatment, complete and mail to the employer, the employer's insurer and third-party , the Claim for Compensation.    Medical Treatment: If you require medical treatment for your " on-the-job injury or OD, you may be required to select a physician or chiropractor from a list provided by your workers’ compensation insurer, if it has contracted with an Organization for Managed Care (MCO) or Preferred Provider Organization (PPO) or providers of health care. If your employer has not entered into a contract with an MCO or PPO, you may select a physician or chiropractor from the Panel of Physicians and Chiropractors. Any medical costs related to your industrial injury or OD will be paid by your insurer.    Temporary Total Disability (TTD): If your doctor has certified that you are unable to work for a period of at least 5 consecutive days, or 5 cumulative days in a 20-day period, or places restrictions on you that your employer does not accommodate, you may be entitled to TTD compensation.    Temporary Partial Disability (TPD): If the wage you receive upon reemployment is less than the compensation for TTD to which you are entitled, the insurer may be required to pay you TPD compensation to make up the difference. TPD can only be paid for a maximum of 24 months.    Permanent Partial Disability (PPD): When your medical condition is stable and there is an indication of a PPD as a result of your injury or OD, within 30 days, your insurer must arrange for an evaluation by a rating physician or chiropractor to determine the degree of your PPD. The amount of your PPD award depends on the date of injury, the results of the PPD evaluation, your age and wage.    Permanent Total Disability (PTD): If you are medically certified by a treating physician or chiropractor as permanently and totally disabled and have been granted a PTD status by your insurer, you are entitled to receive monthly benefits not to exceed 66 2/3% of your average monthly wage. The amount of your PTD payments is subject to reduction if you previously received a lump-sum PPD award.    Vocational Rehabilitation Services: You may be eligible  for vocational rehabilitation services if you are unable to return to the job due to a permanent physical impairment or permanent restrictions as a result of your injury or occupational disease.    Transportation and Per Tariq Reimbursement: You may be eligible for travel expenses and per tariq associated with medical treatment.    Reopening: You may be able to reopen your claim if your condition worsens after claim closure.     Appeal Process: If you disagree with a written determination issued by the insurer or the insurer does not respond to your request, you may appeal to the Department of Administration, , by following the instructions contained in your determination letter. You must appeal the determination within 70 days from the date of the determination letter at [address], [address], [place], [place] [address], or [address], [place], [place] [address]. If you disagree with the  decision, you may appeal to the Department of Administration, . You must file your appeal within 30 days from the date of the  decision letter at [address], [address], [place], [place] [address], or [address], [place], [place] [address]. If you disagree with a decision of an , you may file a petition for judicial review with the District Court. You must do so within 30 days of the Appeal Officer’s decision. You may be represented by an  at your own expense or you may contact the [facility] for possible representation.    [place]  for Injured Workers (NAIW): If you disagree with a  decision, you may request that NAIW represent you without charge at an  Hearing. For information regarding denial of benefits, you may contact the [facility] at: [address], [address], [place] [address], [phone], or [address], [place] [address],  [phone]    To File a Complaint with the Division: If you wish to file a complaint with the  of the Division of Industrial Relations (DIR),  please contact the Workers’ Compensation Section, [address], [address], [place], [place] [address], telephone [phone], or [address], [place], [place] [address], telephone [phone].    For assistance with Workers’ Compensation Issues: You may contact the [facility] Office for Consumer Health Assistance, [address], [address], [place], [place] [address], Toll Free 1-319.173.5011, Web site: http://[facility].[place].gov/Programs/ERIKA E-mail: [email or web address]              __________________________________________________________________                                    _________________            Employee Name / Signature                                                                                                                            Date                                                                                                                                                                                                                              D-2 (rev. 10/20)

## 2023-09-05 NOTE — PROGRESS NOTES
"Subjective:     Dian Tinsley is a 28 y.o. female who presents for Body Fluid Exposure (WC )      DOI: today, 9/5/23 - Pt notes needlestick injury while working with the patient.  She reports the cover or the cap On the needle malfunctioned causing her to have a needlestick.  Needle had just been accessing the patient's port.  Patient was receiving infusion services.    PMH:   No pertinent past medical history to this problem  MEDS:  Medications were reviewed in EMR  ALLERGIES:  Allergies were reviewed in EMR  SOCHX:  Works as a RN  FH:   No pertinent family history to this problem       Objective:     /74 (BP Location: Left arm, Patient Position: Sitting, BP Cuff Size: Adult)   Pulse 82   Temp 36.3 °C (97.4 °F) (Temporal)   Resp 16   Ht 1.676 m (5' 6\")   Wt 109 kg (240 lb)   SpO2 98%   BMI 38.74 kg/m²     Gen: AOx3; Head: NC AT; Eyes: PERRLA/EOM; Lungs: NLR; Cardiac: RR by periph pulse exam; Left hand: Punctate wound consistent with needlestick to finger    Assessment/Plan:       1. Needlestick injury accident with exposure to body fluid  - EXPOSED PERSON-SOURCE PT NEGATIVE (BLOOD & BODY FLUID EXPOSURE); Future  - Referral to Occupational Medicine    Released to Full Duty FROM 9/5/2023 TO 9/8/2023  Full duty, baseline labs ordered today, follow-up with occupational medicine this Friday        Differential diagnosis, natural history, supportive care, and indications for immediate follow-up discussed.    "

## 2023-09-05 NOTE — PROGRESS NOTES
Dian Tinsley is a 28 y.o. female here for a non-provider visit for post accident drug screen    If abnormal was an in office provider notified today (if so, indicate provider)? No    Routed to PCP? No

## 2023-09-05 NOTE — LETTER
[facility] [place] [id number] [facility] [address] [place] [address]  Phone:  [phone] - Fax:  [phone]   [facility] Progress Report and Disability Certification  Date of Service: 9/5/2023   No Show:  No  Date / Time of Next Visit: 9/8/2023- F/U W/ [facility]CC [facility]    Claim Information   Patient Name: Dian Tinsley  Claim Number:     Employer: [employer]  Date of Injury: 9/5/2023     Insurer / TPA: Luisa  ID / SSN:     Occupation: RN  Diagnosis: The encounter diagnosis was Needlestick injury accident with exposure to body fluid.    Medical Information   Related to Industrial Injury? Yes    Subjective Complaints:  DOI: today, 9/5/23 - Pt notes needlestick injury while working with the patient.  She reports the cover or the cap On the needle malfunctioned causing her to have a needlestick.  Needle had just been accessing the patient's port.  Patient was receiving infusion services.   Objective Findings: Gen: AOx3; Head: NC AT; Eyes: PERRLA/EOM; Lungs: NLR; Cardiac: RR by periph pulse exam; Left hand: Punctate wound consistent with needlestick to finger   Pre-Existing Condition(s):     Assessment:   Initial Visit    Status: Additional Care Required  Permanent Disability:No    Plan:   Comments:Full duty, baseline labs ordered today, follow-up with occupational medicine this Friday    Diagnostics:      Comments:       Disability Information   Status: Released to Full Duty    From:  9/5/2023  Through: 9/8/2023 Restrictions are:     Physical Restrictions   Sitting:    Standing:    Stooping:    Bending:      Squatting:    Walking:    Climbing:    Pushing:      Pulling:    Other:    Reaching Above Shoulder (L):   Reaching Above Shoulder (R):       Reaching Below Shoulder (L):    Reaching Below Shoulder (R):      Not to exceed Weight Limits   Carrying(hrs):   Weight Limit(lb):   Lifting(hrs):   Weight  Limit(lb):     Comments: Full duty, baseline labs ordered today, follow-up with  occupational medicine this Friday     Repetitive Actions   Hands: i.e. Fine Manipulations from Grasping:     Feet: i.e. Operating Foot Controls:     Driving / Operate Machinery:     Health Care Provider’s Original or Electronic Signature  Cam Stephens P.A.-C. Health Care Provider’s Original or Electronic Signature    Kenneth Spann DO MPH     Clinic Name / Location: [address] [place] [address] Clinic Phone Number: Dept: [phone]   Appointment Time: 9:15 Am Visit Start Time: 10:06 AM   Check-In Time:  9:18 Am Visit Discharge Time:  10:41 AM    Original-Treating Physician or Chiropractor    Page 2-Insurer/TPA    Page 3-Employer    Page 4-Employee

## 2023-09-06 NOTE — PROGRESS NOTES
Pt here for lab draw. Pt with paper order. Labs drawn as ordered. Pt tolerated lab drawn without s/s adverse reaction. Pt discharged to self care, NAD.

## 2023-09-08 ENCOUNTER — OCCUPATIONAL MEDICINE (OUTPATIENT)
Dept: OCCUPATIONAL MEDICINE | Facility: CLINIC | Age: 28
End: 2023-09-08
Payer: COMMERCIAL

## 2023-09-08 VITALS
TEMPERATURE: 97.6 F | SYSTOLIC BLOOD PRESSURE: 128 MMHG | DIASTOLIC BLOOD PRESSURE: 70 MMHG | HEART RATE: 77 BPM | OXYGEN SATURATION: 97 % | HEIGHT: 66 IN | WEIGHT: 242.6 LBS | RESPIRATION RATE: 16 BRPM | BODY MASS INDEX: 38.99 KG/M2

## 2023-09-08 DIAGNOSIS — Z77.21 EXPOSURE TO BLOOD OR BODY FLUID: ICD-10-CM

## 2023-09-08 PROCEDURE — 3074F SYST BP LT 130 MM HG: CPT | Performed by: NURSE PRACTITIONER

## 2023-09-08 PROCEDURE — 99213 OFFICE O/P EST LOW 20 MIN: CPT | Performed by: NURSE PRACTITIONER

## 2023-09-08 PROCEDURE — 3078F DIAST BP <80 MM HG: CPT | Performed by: NURSE PRACTITIONER

## 2023-09-08 NOTE — LETTER
[address],   [address] [place] [address]  Phone:  [phone] - Fax:  [phone]   Occupational [facility] Progress Report and Disability Certification  Date of Service: 9/8/2023   No Show:  No  Date / Time of Next Visit:  Discharge/MMI  Released to full duty   Claim Information   Patient Name: Dian Tinsley  Claim Number:     Employer: [employer]  Date of Injury: 9/5/2023     Insurer / TPA: Luisa  ID / SSN:     Occupation: RN  Diagnosis: The encounter diagnosis was Exposure to blood or body fluid.    Medical Information   Related to Industrial Injury? Yes    Subjective Complaints:  DOI 9/5/2023: NIRMALA: Accessing a patient port and the safety did not click as a result ended up with a needle stick to the left pointer finger.  Today patient remains asymptomatic.  Baseline labs reviewed with patient negative for hep C and HIV.  However labs do demonstrate that patient has no immunity to hepatitis B, as evidenced by low hepatitis B surface antibody results.  Patient currently doing allergy shots, would like to hold off on hepatitis B vaccine at this time.  Source had labs drawn for HIV and hep C.  Per CDC postexposure repeat testing no further follow-up indicated at this time.  Plan of care discussed with patient.   Objective Findings: Left finger: No gross deformity or discoloration noted.  Brisk cap refill.  Negative edema, erythema, foul discharge, no open wounds, or abnormal warmth.   Pre-Existing Condition(s):     Assessment:   Condition Improved    Status: Discharged /  MMI  Permanent Disability:No    Plan:      Diagnostics:      Comments:  Discharged/MMI   Released to full duty   Follow-up if needed   Recommend Hepatitis B vaccine     Disability Information   Status: Released to Full Duty    From:  9/8/2023  Through:   Restrictions are:     Physical Restrictions   Sitting:    Standing:    Stooping:    Bending:      Squatting:    Walking:     Climbing:    Pushing:      Pulling:    Other:    Reaching Above Shoulder (L):   Reaching Above Shoulder (R):       Reaching Below Shoulder (L):    Reaching Below Shoulder (R):      Not to exceed Weight Limits   Carrying(hrs):   Weight Limit(lb):   Lifting(hrs):   Weight  Limit(lb):     Comments:      Repetitive Actions   Hands: i.e. Fine Manipulations from Grasping:     Feet: i.e. Operating Foot Controls:     Driving / Operate Machinery:     Health Care Provider’s Original or Electronic Signature  TIMI Garrido Health Care Provider’s Original or Electronic Signature    Kenneth Spann DO MPH     Clinic Name / Location: [address],   [address] [place] [id number] Clinic Phone Number: Dept: [phone]   Appointment Time: 11:30 Am Visit Start Time: 11:44 AM   Check-In Time:  11:37 Am Visit Discharge Time:  12:02 PM   Original-Treating Physician or Chiropractor    Page 2-Insurer/TPA    Page 3-Employer    Page 4-Employee

## 2023-09-08 NOTE — PROGRESS NOTES
"Subjective:     Dian Tinsley is a 28 y.o. female who presents for Other (NEW WC DOI: 09/05/2023 NEEDLESTICK FEELING BETTER RM 16)      DOI 9/5/2023: NIRMALA: Accessing a patient port and the safety did not click as a result ended up with a needle stick to the left pointer finger.  Today patient remains asymptomatic.  Baseline labs reviewed with patient negative for hep C and HIV.  However labs do demonstrate that patient has no immunity to hepatitis B, as evidenced by low hepatitis B surface antibody results.  Patient currently doing allergy shots, would like to hold off on hepatitis B vaccine at this time.  Source had labs drawn for HIV and hep C.  Per CDC postexposure repeat testing no further follow-up indicated at this time.  Plan of care discussed with patient.    ROS: All systems were reviewed on intake form, form was reviewed and signed. See scanned documents in media. Pertinent positives and negatives included in HPI.    PMH: No pertinent past medical history to this problem  MEDS: Medications were reviewed in Epic  ALLERGIES:   Allergies   Allergen Reactions    Gluten Diarrhea     Bloating/diarrhea    Lactose Diarrhea     Bloating/diarrhea    Sulfa Drugs Rash     rash    Latex      SOCHX: Works as RN at [employer]  FH: No pertinent family history to this problem       Objective:     /70 (BP Location: Right arm, Patient Position: Sitting, BP Cuff Size: Adult)   Pulse 77   Temp 36.4 °C (97.6 °F) (Temporal)   Resp 16   Ht 1.676 m (5' 6\")   Wt 110 kg (242 lb 9.6 oz)   SpO2 97%   BMI 39.16 kg/m²     [unfilled]    Left finger: No gross deformity or discoloration noted.  Brisk cap refill.  Negative edema, erythema, foul discharge, no open wounds, or abnormal warmth.    Assessment/Plan:       1. Exposure to blood or body fluid    Released to Full Duty FROM 9/8/2023 TO       Discharged/MMI   Released to full duty   Follow-up if needed   Recommend Hepatitis B vaccine     Differential " diagnosis, natural history, supportive care, and indications for immediate follow-up discussed.    Approximately 25 minutes were spent in reviewing notes, preparing for visit, obtaining history, exam and evaluation, patient counseling/education and post visit documentation/orders.

## 2023-09-26 ENCOUNTER — OUTPATIENT INFUSION SERVICES (OUTPATIENT)
Dept: ONCOLOGY | Facility: MEDICAL CENTER | Age: 28
End: 2023-09-26
Attending: PHYSICIAN ASSISTANT
Payer: COMMERCIAL

## 2023-09-26 ENCOUNTER — HOSPITAL ENCOUNTER (OUTPATIENT)
Dept: RADIOLOGY | Facility: MEDICAL CENTER | Age: 28
End: 2023-09-26
Attending: NURSE PRACTITIONER
Payer: COMMERCIAL

## 2023-09-26 DIAGNOSIS — E73.9 INTESTINAL DISACCHARIDASE DEFICIENCIES AND DISACCHARIDE MALABSORPTION: ICD-10-CM

## 2023-09-26 DIAGNOSIS — R14.0 GASTRIC TYMPANY: ICD-10-CM

## 2023-09-26 DIAGNOSIS — D69.6 THROMBOCYTOPENIA (HCC): ICD-10-CM

## 2023-09-26 DIAGNOSIS — K59.00 CONSTIPATION, UNSPECIFIED CONSTIPATION TYPE: ICD-10-CM

## 2023-09-26 DIAGNOSIS — R15.9 FULL INCONTINENCE OF FECES: ICD-10-CM

## 2023-09-26 DIAGNOSIS — R19.8 RASPBERRY TONGUE: ICD-10-CM

## 2023-09-26 DIAGNOSIS — R11.0 NAUSEA: ICD-10-CM

## 2023-09-26 DIAGNOSIS — K62.5 HEMORRHAGE OF RECTUM AND ANUS: ICD-10-CM

## 2023-09-26 DIAGNOSIS — R10.13 ABDOMINAL PAIN, EPIGASTRIC: ICD-10-CM

## 2023-09-26 DIAGNOSIS — R19.7 DIARRHEA OF PRESUMED INFECTIOUS ORIGIN: ICD-10-CM

## 2023-09-26 DIAGNOSIS — R10.9 STOMACH ACHE: ICD-10-CM

## 2023-09-26 LAB
T3FREE SERPL-MCNC: 3.45 PG/ML (ref 2–4.4)
T4 FREE SERPL-MCNC: 1 NG/DL (ref 0.93–1.7)
TSH SERPL DL<=0.005 MIU/L-ACNC: 2.41 UIU/ML (ref 0.38–5.33)

## 2023-09-26 PROCEDURE — 36415 COLL VENOUS BLD VENIPUNCTURE: CPT

## 2023-09-26 PROCEDURE — 84439 ASSAY OF FREE THYROXINE: CPT

## 2023-09-26 PROCEDURE — 74018 RADEX ABDOMEN 1 VIEW: CPT

## 2023-09-26 PROCEDURE — 84481 FREE ASSAY (FT-3): CPT

## 2023-09-26 PROCEDURE — 84443 ASSAY THYROID STIM HORMONE: CPT

## 2023-09-27 NOTE — PROGRESS NOTES
Patient to [facility] for lab drawn. Lab drawn by venipuncture in right forearm. Patient to home in care of self.

## 2023-10-06 ENCOUNTER — PHARMACY VISIT (OUTPATIENT)
Dept: PHARMACY | Facility: MEDICAL CENTER | Age: 28
End: 2023-10-06
Payer: COMMERCIAL

## 2023-10-06 PROCEDURE — RXOTC WILLOW AMBULATORY OTC CHARGE: Performed by: PHARMACIST

## 2023-10-06 PROCEDURE — RXMED WILLOW AMBULATORY MEDICATION CHARGE: Performed by: NURSE PRACTITIONER

## 2023-10-13 ENCOUNTER — EH NON-PROVIDER (OUTPATIENT)
Dept: OCCUPATIONAL MEDICINE | Facility: CLINIC | Age: 28
End: 2023-10-13

## 2023-10-13 DIAGNOSIS — Z02.89 ENCOUNTER FOR OCCUPATIONAL HEALTH ASSESSMENT: ICD-10-CM

## 2023-10-13 PROCEDURE — 94375 RESPIRATORY FLOW VOLUME LOOP: CPT | Performed by: NURSE PRACTITIONER

## 2023-10-19 ENCOUNTER — PHARMACY VISIT (OUTPATIENT)
Dept: PHARMACY | Facility: MEDICAL CENTER | Age: 28
End: 2023-10-19
Payer: COMMERCIAL

## 2023-10-19 PROCEDURE — RXMED WILLOW AMBULATORY MEDICATION CHARGE: Performed by: INTERNAL MEDICINE

## 2023-12-18 PROCEDURE — RXMED WILLOW AMBULATORY MEDICATION CHARGE: Performed by: INTERNAL MEDICINE

## 2023-12-19 ENCOUNTER — PHARMACY VISIT (OUTPATIENT)
Dept: PHARMACY | Facility: MEDICAL CENTER | Age: 28
End: 2023-12-19
Payer: COMMERCIAL

## 2024-02-08 ENCOUNTER — OFFICE VISIT (OUTPATIENT)
Dept: MEDICAL GROUP | Facility: LAB | Age: 29
End: 2024-02-08
Payer: COMMERCIAL

## 2024-02-08 VITALS
HEART RATE: 89 BPM | HEIGHT: 66 IN | WEIGHT: 247 LBS | RESPIRATION RATE: 16 BRPM | OXYGEN SATURATION: 98 % | TEMPERATURE: 98.3 F | BODY MASS INDEX: 39.7 KG/M2 | DIASTOLIC BLOOD PRESSURE: 86 MMHG | SYSTOLIC BLOOD PRESSURE: 118 MMHG

## 2024-02-08 DIAGNOSIS — Z00.00 WELL ADULT EXAM: ICD-10-CM

## 2024-02-08 DIAGNOSIS — S39.92XA INJURY OF COCCYX, INITIAL ENCOUNTER: ICD-10-CM

## 2024-02-08 DIAGNOSIS — E66.09 CLASS 2 OBESITY DUE TO EXCESS CALORIES WITHOUT SERIOUS COMORBIDITY WITH BODY MASS INDEX (BMI) OF 39.0 TO 39.9 IN ADULT: ICD-10-CM

## 2024-02-08 PROCEDURE — 3079F DIAST BP 80-89 MM HG: CPT | Performed by: FAMILY MEDICINE

## 2024-02-08 PROCEDURE — 3074F SYST BP LT 130 MM HG: CPT | Performed by: FAMILY MEDICINE

## 2024-02-08 PROCEDURE — 99214 OFFICE O/P EST MOD 30 MIN: CPT | Performed by: FAMILY MEDICINE

## 2024-02-08 RX ORDER — FAMOTIDINE 20 MG/1
20 TABLET, FILM COATED ORAL DAILY
COMMUNITY

## 2024-02-08 RX ORDER — FEXOFENADINE HCL 60 MG/1
60 TABLET, FILM COATED ORAL DAILY
COMMUNITY

## 2024-02-08 ASSESSMENT — PATIENT HEALTH QUESTIONNAIRE - PHQ9: CLINICAL INTERPRETATION OF PHQ2 SCORE: 0

## 2024-02-08 NOTE — LETTER
Kizoom  Ankur Noel M.D.  [address]  [place] [address]  Fax: [phone]   Authorization for Release/Disclosure of   Protected Health Information   Name: GILLIAN AMBROCIO : 1995 SSN: [id number]   Address: [facility] [address] [facility]  [id number]  [place] [address] Phone:    [phone] (home)    I authorize the entity listed below to release/disclose the PHI below to:   [facility]/nAkur Noel M.D. and Ankur Noel M.D.   Provider or Entity Name:     [facility]   Address   City, State, [facility]   Phone:      Fax:     Reason for request: continuity of care   Information to be released:    [  ] LAST COLONOSCOPY,  including any PATH REPORT and follow-up  [  ] LAST FIT/COLOGUARD RESULT [  ] LAST DEXA  [  ] LAST MAMMOGRAM  [XX] LAST PAP  [  ] LAST LABS [  ] RETINA EXAM REPORT  [  ] IMMUNIZATION RECORDS  [  ] Release all info      [  ] Check here and initial the line next to each item to release ALL health information INCLUDING  _____ Care and treatment for drug and / or alcohol abuse  _____ HIV testing, infection status, or AIDS  _____ Genetic Testing    DATES OF SERVICE OR TIME PERIOD TO BE DISCLOSED: _____________  I understand and acknowledge that:  * This Authorization may be revoked at any time by you in writing, except if your health information has already been used or disclosed.  * Your health information that will be used or disclosed as a result of you signing this authorization could be re-disclosed by the recipient. If this occurs, your re-disclosed health information may no longer be protected by State or Federal laws.  * You may refuse to sign this Authorization. Your refusal will not affect your ability to obtain treatment.  * This Authorization becomes effective upon signing and will  on (date) __________.      If no date is indicated, this Authorization will  one (1) year from the signature date.    Name:  Dian Tinsley  Signature: Date:   2/8/2024     PLEASE FAX REQUESTED RECORDS BACK TO: [phone]

## 2024-02-08 NOTE — PROGRESS NOTES
"Subjective:     CC: Tailbone pain, weight    HPI:   Dian presents today to discuss persistent tailbone pain and weight gain.    Fell out of bed and hit tailbone about 1 to 2 months ago.  Slowly improving but has had sharp pain with sitting worse over the last few weeks.  Has examined the area and not noticed any redness, fluctuance, drainage, swelling.    Has gained about 20 to 25 pounds recently despite working on healthier diet.  No regular exercise at this time but she is not sedentary at work.  Conscious about diet choices and trying to limit to 1500 to 1800 kcal daily.  Had some fatigue and cold intolerance.    Medications, past medical history, allergies, and social history have been reviewed and updated.      Objective:       Exam:  /86   Pulse 89   Temp 36.8 °C (98.3 °F) (Temporal)   Resp 16   Ht 1.676 m (5' 6\")   Wt 112 kg (247 lb)   SpO2 98%   BMI 39.87 kg/m²  Body mass index is 39.87 kg/m².    Constitutional: Alert. Well appearing. No distress.  Skin: Warm, dry, good turgor, no visible rashes.  Respiratory: Normal effort.   Coccyx: declined  Psych: Answers questions appropriately.     Assessment & Plan:     28 y.o. female with the following -     1. Injury of coccyx, initial encounter  Persistent pain after fall out of bed 1 to 2 months ago.  Exam declined today but she reports no overlying redness, fluctuance, drainage, swelling.  Suspect possible fracture recommend continued supportive management including anti-inflammatories, ice, avoiding direct pressure.  X-ray if pain persists  - DX-SACRUM AND COCCYX 2+; Future    2. Class 2 severe obesity due to excess calories with serious comorbidity and body mass index (BMI) of 39.0 to 39.9 in adult (HCC)  Recent weight gain despite making changes to diet and trying to limit calories.  Trying to limit to 1500 to 1800 kcal daily, not exercising regularly but not sedentary at work.  Will check thyroid.  We discussed difficulty of accurate calorie " counting likelihood that she is likely getting more than she suspects.  Try making some changes to decrease calories and adding some dedicated exercise including cardiovascular and resistance based.  - TSH WITH REFLEX TO FT4; Future    3. Well adult exam  - CBC WITH DIFFERENTIAL; Future  - Comp Metabolic Panel; Future  - Lipid Profile; Future  - TSH WITH REFLEX TO FT4; Future  - HEMOGLOBIN A1C; Future      Please note that this note was created using voice recognition software.

## 2024-02-13 ENCOUNTER — HOSPITAL ENCOUNTER (OUTPATIENT)
Dept: RADIOLOGY | Facility: MEDICAL CENTER | Age: 29
End: 2024-02-13
Attending: FAMILY MEDICINE
Payer: COMMERCIAL

## 2024-02-13 DIAGNOSIS — S39.92XA INJURY OF COCCYX, INITIAL ENCOUNTER: ICD-10-CM

## 2024-02-13 PROCEDURE — 72220 X-RAY EXAM SACRUM TAILBONE: CPT

## 2024-02-14 ENCOUNTER — OUTPATIENT INFUSION SERVICES (OUTPATIENT)
Dept: ONCOLOGY | Facility: MEDICAL CENTER | Age: 29
End: 2024-02-14
Attending: FAMILY MEDICINE
Payer: COMMERCIAL

## 2024-02-14 DIAGNOSIS — R71.8 MICROCYTOSIS: ICD-10-CM

## 2024-02-14 DIAGNOSIS — E66.09 CLASS 2 OBESITY DUE TO EXCESS CALORIES WITHOUT SERIOUS COMORBIDITY WITH BODY MASS INDEX (BMI) OF 39.0 TO 39.9 IN ADULT: ICD-10-CM

## 2024-02-14 DIAGNOSIS — Z00.00 WELL ADULT EXAM: ICD-10-CM

## 2024-02-14 LAB
ALBUMIN SERPL BCP-MCNC: 3.4 G/DL (ref 3.2–4.9)
ALBUMIN/GLOB SERPL: 0.9 G/DL
ALP SERPL-CCNC: 70 U/L (ref 30–99)
ALT SERPL-CCNC: 21 U/L (ref 2–50)
ANION GAP SERPL CALC-SCNC: 13 MMOL/L (ref 7–16)
AST SERPL-CCNC: 17 U/L (ref 12–45)
BASOPHILS # BLD AUTO: 0.8 % (ref 0–1.8)
BASOPHILS # BLD: 0.07 K/UL (ref 0–0.12)
BILIRUB SERPL-MCNC: 0.4 MG/DL (ref 0.1–1.5)
BUN SERPL-MCNC: 9 MG/DL (ref 8–22)
CALCIUM ALBUM COR SERPL-MCNC: 10 MG/DL (ref 8.5–10.5)
CALCIUM SERPL-MCNC: 9.5 MG/DL (ref 8.5–10.5)
CHLORIDE SERPL-SCNC: 104 MMOL/L (ref 96–112)
CHOLEST SERPL-MCNC: 179 MG/DL (ref 100–199)
CO2 SERPL-SCNC: 22 MMOL/L (ref 20–33)
CREAT SERPL-MCNC: 0.59 MG/DL (ref 0.5–1.4)
EOSINOPHIL # BLD AUTO: 0.21 K/UL (ref 0–0.51)
EOSINOPHIL NFR BLD: 2.4 % (ref 0–6.9)
ERYTHROCYTE [DISTWIDTH] IN BLOOD BY AUTOMATED COUNT: 35.5 FL (ref 35.9–50)
EST. AVERAGE GLUCOSE BLD GHB EST-MCNC: 88 MG/DL
FERRITIN SERPL-MCNC: 117 NG/ML (ref 10–291)
GFR SERPLBLD CREATININE-BSD FMLA CKD-EPI: 125 ML/MIN/1.73 M 2
GLOBULIN SER CALC-MCNC: 3.9 G/DL (ref 1.9–3.5)
GLUCOSE SERPL-MCNC: 94 MG/DL (ref 65–99)
HBA1C MFR BLD: 4.7 % (ref 4–5.6)
HCT VFR BLD AUTO: 44.7 % (ref 37–47)
HDLC SERPL-MCNC: 39 MG/DL
HGB BLD-MCNC: 15.2 G/DL (ref 12–16)
IMM GRANULOCYTES # BLD AUTO: 0.03 K/UL (ref 0–0.11)
IMM GRANULOCYTES NFR BLD AUTO: 0.3 % (ref 0–0.9)
IRON SATN MFR SERPL: 28 % (ref 15–55)
IRON SERPL-MCNC: 96 UG/DL (ref 40–170)
LDLC SERPL CALC-MCNC: 117 MG/DL
LYMPHOCYTES # BLD AUTO: 3.07 K/UL (ref 1–4.8)
LYMPHOCYTES NFR BLD: 35.5 % (ref 22–41)
MCH RBC QN AUTO: 27.1 PG (ref 27–33)
MCHC RBC AUTO-ENTMCNC: 34 G/DL (ref 32.2–35.5)
MCV RBC AUTO: 79.8 FL (ref 81.4–97.8)
MONOCYTES # BLD AUTO: 0.47 K/UL (ref 0–0.85)
MONOCYTES NFR BLD AUTO: 5.4 % (ref 0–13.4)
NEUTROPHILS # BLD AUTO: 4.81 K/UL (ref 1.82–7.42)
NEUTROPHILS NFR BLD: 55.6 % (ref 44–72)
NRBC # BLD AUTO: 0 K/UL
NRBC BLD-RTO: 0 /100 WBC (ref 0–0.2)
PLATELET # BLD AUTO: 306 K/UL (ref 164–446)
PMV BLD AUTO: 10 FL (ref 9–12.9)
POTASSIUM SERPL-SCNC: 3.9 MMOL/L (ref 3.6–5.5)
PROT SERPL-MCNC: 7.3 G/DL (ref 6–8.2)
RBC # BLD AUTO: 5.6 M/UL (ref 4.2–5.4)
SODIUM SERPL-SCNC: 139 MMOL/L (ref 135–145)
TIBC SERPL-MCNC: 349 UG/DL (ref 250–450)
TRIGL SERPL-MCNC: 116 MG/DL (ref 0–149)
TSH SERPL DL<=0.005 MIU/L-ACNC: 2.64 UIU/ML (ref 0.38–5.33)
UIBC SERPL-MCNC: 253 UG/DL (ref 110–370)
WBC # BLD AUTO: 8.7 K/UL (ref 4.8–10.8)

## 2024-02-14 PROCEDURE — 83036 HEMOGLOBIN GLYCOSYLATED A1C: CPT

## 2024-02-14 PROCEDURE — 84443 ASSAY THYROID STIM HORMONE: CPT

## 2024-02-14 PROCEDURE — 83540 ASSAY OF IRON: CPT

## 2024-02-14 PROCEDURE — 82728 ASSAY OF FERRITIN: CPT

## 2024-02-14 PROCEDURE — 83550 IRON BINDING TEST: CPT

## 2024-02-14 PROCEDURE — 80061 LIPID PANEL: CPT

## 2024-02-14 PROCEDURE — 80053 COMPREHEN METABOLIC PANEL: CPT

## 2024-02-14 PROCEDURE — 36415 COLL VENOUS BLD VENIPUNCTURE: CPT

## 2024-02-14 PROCEDURE — 85025 COMPLETE CBC W/AUTO DIFF WBC: CPT

## 2024-02-14 NOTE — PROGRESS NOTES
Dian to [facility] for peripheral lab draw. Drawn as ordered from [facility] without issue. Sent to lab for processing.

## 2024-03-08 ENCOUNTER — PATIENT MESSAGE (OUTPATIENT)
Dept: MEDICAL GROUP | Facility: LAB | Age: 29
End: 2024-03-08
Payer: COMMERCIAL

## 2024-03-08 DIAGNOSIS — M53.3 COCCYX PAIN: ICD-10-CM

## 2024-03-26 PROCEDURE — RXMED WILLOW AMBULATORY MEDICATION CHARGE: Performed by: INTERNAL MEDICINE

## 2024-03-27 ENCOUNTER — PHARMACY VISIT (OUTPATIENT)
Dept: PHARMACY | Facility: MEDICAL CENTER | Age: 29
End: 2024-03-27
Payer: COMMERCIAL

## 2024-04-04 ENCOUNTER — APPOINTMENT (OUTPATIENT)
Dept: RADIOLOGY | Facility: MEDICAL CENTER | Age: 29
End: 2024-04-04
Attending: FAMILY MEDICINE
Payer: COMMERCIAL

## 2024-04-04 DIAGNOSIS — M53.3 COCCYX PAIN: ICD-10-CM

## 2024-04-04 PROCEDURE — 72195 MRI PELVIS W/O DYE: CPT

## 2024-04-16 ENCOUNTER — OFFICE VISIT (OUTPATIENT)
Dept: MEDICAL GROUP | Facility: LAB | Age: 29
End: 2024-04-16
Payer: COMMERCIAL

## 2024-04-16 VITALS
HEIGHT: 66 IN | OXYGEN SATURATION: 98 % | RESPIRATION RATE: 16 BRPM | HEART RATE: 76 BPM | DIASTOLIC BLOOD PRESSURE: 82 MMHG | TEMPERATURE: 98.3 F | BODY MASS INDEX: 40.82 KG/M2 | SYSTOLIC BLOOD PRESSURE: 132 MMHG | WEIGHT: 254 LBS

## 2024-04-16 DIAGNOSIS — M53.3 COCCYX PAIN: ICD-10-CM

## 2024-04-16 PROCEDURE — 99214 OFFICE O/P EST MOD 30 MIN: CPT | Performed by: FAMILY MEDICINE

## 2024-04-16 PROCEDURE — 3079F DIAST BP 80-89 MM HG: CPT | Performed by: FAMILY MEDICINE

## 2024-04-16 PROCEDURE — 3075F SYST BP GE 130 - 139MM HG: CPT | Performed by: FAMILY MEDICINE

## 2024-04-16 PROCEDURE — RXMED WILLOW AMBULATORY MEDICATION CHARGE: Performed by: FAMILY MEDICINE

## 2024-04-16 RX ORDER — MELOXICAM 15 MG/1
15 TABLET ORAL DAILY
Qty: 14 TABLET | Refills: 0 | Status: SHIPPED | OUTPATIENT
Start: 2024-04-16 | End: 2024-05-01

## 2024-04-16 ASSESSMENT — FIBROSIS 4 INDEX: FIB4 SCORE: 0.34

## 2024-04-16 NOTE — PROGRESS NOTES
"Subjective:     CC: Coccyx pain    HPI:   Dian presents today with persistent coccyx pain that has been going on for 4+ months.  May have began initially after fall with trauma to the area.  Resultant x-ray and MRI have been negative for any obvious pathology.  Pain continues and can be severe at times.  Will get persistent and worsening pain if she is sitting longer than 20 minutes.  Prolonged sitting is basically intolerable and she was in severe pain after recent flight.  She has tried intermittent NSAIDs without significant improvement.  No fevers.  She does have a history of pilonidal disease but denies any discharge, swelling, redness.  MRI was without any soft tissue edema.    Medications, past medical history, allergies, and social history have been reviewed and updated.      Objective:       Exam:  /82   Pulse 76   Temp 36.8 °C (98.3 °F) (Temporal)   Resp 16   Ht 1.676 m (5' 6\")   Wt 115 kg (254 lb)   SpO2 98%   BMI 41.00 kg/m²  Body mass index is 41 kg/m².    Constitutional: Alert. Well appearing. No distress.  Skin: Pilonidal pit present to the superior gluteal cleft without surrounding tenderness, erythema, or fluid collection.  There is discrete tenderness inferior to this over the coccyx.  Respiratory: Normal effort.   Psych: Answers questions appropriately. Normal affect and mood.      Assessment & Plan:     28 y.o. female with the following -     1. Coccyx pain  Persistent and severe coccyx pain going on for 4+ months, may have initially been triggered with fall/trauma.  XR and MRI have been without obvious cause.  She does have pilonidal pit present but this does not appear infected/inflamed and the tenderness is inferior to this over the coccyx.  MRI was also without signs of soft tissue inflammation/infection.  Will trial course of NSAID with Mobic, referral is also placed to physiatry to discuss possible interventional treatment for this  - meloxicam (MOBIC) 15 MG tablet; Take 1 " Resume rhGH.  Continue healthy lifestyle.  F/u in 5 mo.   Tablet by mouth every day for 14 days.  Dispense: 14 Tablet; Refill: 0  - Referral to Pain Clinic    Please note that this note was created using voice recognition software.

## 2024-04-17 ENCOUNTER — PHARMACY VISIT (OUTPATIENT)
Dept: PHARMACY | Facility: MEDICAL CENTER | Age: 29
End: 2024-04-17
Payer: COMMERCIAL

## 2024-04-29 ENCOUNTER — OFFICE VISIT (OUTPATIENT)
Dept: PHYSICAL MEDICINE AND REHAB | Facility: MEDICAL CENTER | Age: 29
End: 2024-04-29
Payer: COMMERCIAL

## 2024-04-29 VITALS
OXYGEN SATURATION: 97 % | DIASTOLIC BLOOD PRESSURE: 72 MMHG | HEIGHT: 66 IN | WEIGHT: 250.8 LBS | TEMPERATURE: 97.2 F | SYSTOLIC BLOOD PRESSURE: 118 MMHG | HEART RATE: 80 BPM | BODY MASS INDEX: 40.31 KG/M2

## 2024-04-29 DIAGNOSIS — M53.3 COCCYDYNIA: ICD-10-CM

## 2024-04-29 DIAGNOSIS — M54.50 CHRONIC BILATERAL LOW BACK PAIN WITHOUT SCIATICA: ICD-10-CM

## 2024-04-29 DIAGNOSIS — M79.10 MYALGIA: ICD-10-CM

## 2024-04-29 DIAGNOSIS — Z71.82 EXERCISE COUNSELING: ICD-10-CM

## 2024-04-29 DIAGNOSIS — G89.29 CHRONIC BILATERAL LOW BACK PAIN WITHOUT SCIATICA: ICD-10-CM

## 2024-04-29 ASSESSMENT — FIBROSIS 4 INDEX: FIB4 SCORE: 0.34

## 2024-04-29 ASSESSMENT — PAIN SCALES - GENERAL: PAINLEVEL: 6=MODERATE PAIN

## 2024-04-29 ASSESSMENT — PATIENT HEALTH QUESTIONNAIRE - PHQ9: CLINICAL INTERPRETATION OF PHQ2 SCORE: 0

## 2024-04-29 NOTE — PROGRESS NOTES
New patient note    Interventional spine and Pain  Physiatry (physical medicine and  Rehabilitation)     Date of service: See epic    Chief complaint:   Chief Complaint   Patient presents with    New Patient     Lower back pain        Referring provider: Ankur Noel M.D.     HISTORY    HPI: Dian Tinsley 28 y.o.  who presents today with Diagnoses of Coccydynia, Myalgia, Chronic bilateral low back pain without sciatica, BMI 40.0-44.9, adult ([facility]), and Exercise counseling were pertinent to this visit.    HPI    The patient is had chronic coccygeal pain after a fall in the middle of the night.  She has not had falls since then.  This is been since December 2023.  Pain was previously 9-10 /10 in intensity NRS.  Pain is currently 4-5 /10 in intensity with a significant improvement in pain on meloxicam.  The pain is nonradiating around the level of the coccyx.  Denies low back pain.  This is worse with sitting improves with standing.  Denies radiating pain. She tried advil with no improvement.        Medical records review:  I reviewed the note from the referring provider Ankur Noel* including the note dated 4/16/2024.           ROS:   Red Flags ROS:   Fever, Chills, Sweats: Denies  Involuntary Weight Loss: Denies  Bladder Incontinence: Denies  Bowel Incontinence: denies  Saddle Anesthesia: Denies    All other systems reviewed and negative.       PMHx:   Past Medical History:   Diagnosis Date    Bronchitis 2005    Other specified disorder of intestines     Secondary to Gluten allergy         Current Outpatient Medications on File Prior to Visit   Medication Sig Dispense Refill    meloxicam (MOBIC) 15 MG tablet Take 1 Tablet by mouth every day for 14 days. 14 Tablet 0    montelukast (SINGULAIR) 10 MG Tab Take 1 tablet orally nightly before bed. 90 Tablet 1    famotidine (PEPCID) 20 MG Tab Take 20 mg by mouth every day.      fexofenadine (ALLEGRA ALLERGY) 60 MG Tab Take 60 mg by mouth every  day.      EPINEPHrine (EPIPEN) 0.3 MG/0.3ML Solution Auto-injector solution for injection Use as directed. Injection in upper outer thigh and hold in place for 5 seconds and get medical emergency help immediately. 2 Each 1    albuterol 108 (90 Base) MCG/ACT Aero Soln inhalation aerosol Inhale 2 puffs inhaled every 4-6 hrs as  needed and 2 puffs 15 min prior to physical exertion. 8.5 g 1    azelastine (ASTELIN) 137 MCG/SPRAY nasal spray instill 2 sprays into each nostril twice a day 90 mL 1    pimecrolimus (ELIDEL) 1 % cream Apply sparingly to affected area 2 times a day as needed for itching. 60 g 2     No current facility-administered medications on file prior to visit.        PSHx:   Past Surgical History:   Procedure Laterality Date    TONSILLECTOMY  12/13/2013    Performed by Jose Mackey M.D. at SURGERY [facility] ORS    OTHER  2012    wisdom teeth extraction    OTHER  1997    Sinus opening from ear into chest-repaired       Family history   Family History   Problem Relation Age of Onset    No Known Problems Mother     Kidney Disease Father     Arthritis Father     Alcohol/Drug Father          Medications: reviewed on epic.   Outpatient Medications Marked as Taking for the 4/29/24 encounter (Office Visit) with Elmer Duval M.D.   Medication Sig Dispense Refill    meloxicam (MOBIC) 15 MG tablet Take 1 Tablet by mouth every day for 14 days. 14 Tablet 0    montelukast (SINGULAIR) 10 MG Tab Take 1 tablet orally nightly before bed. 90 Tablet 1    famotidine (PEPCID) 20 MG Tab Take 20 mg by mouth every day.      fexofenadine (ALLEGRA ALLERGY) 60 MG Tab Take 60 mg by mouth every day.      EPINEPHrine (EPIPEN) 0.3 MG/0.3ML Solution Auto-injector solution for injection Use as directed. Injection in upper outer thigh and hold in place for 5 seconds and get medical emergency help immediately. 2 Each 1    albuterol 108 (90 Base) MCG/ACT Aero Soln inhalation aerosol Inhale 2 puffs inhaled every 4-6 hrs as  needed  "and 2 puffs 15 min prior to physical exertion. 8.5 g 1    azelastine (ASTELIN) 137 MCG/SPRAY nasal spray instill 2 sprays into each nostril twice a day 90 mL 1    pimecrolimus (ELIDEL) 1 % cream Apply sparingly to affected area 2 times a day as needed for itching. 60 g 2        Allergies:   Allergies   Allergen Reactions    Gluten Diarrhea     Bloating/diarrhea    Lactose Diarrhea     Bloating/diarrhea    Sulfa Drugs Rash     rash    Latex        Social Hx:   Social History     Socioeconomic History    Marital status: Single     Spouse name: Not on file    Number of children: Not on file    Years of education: Not on file    Highest education level: Not on file   Occupational History    Not on file   Tobacco Use    Smoking status: Never    Smokeless tobacco: Never   Vaping Use    Vaping Use: Never used   Substance and Sexual Activity    Alcohol use: Yes     Comment: rare    Drug use: No    Sexual activity: Never   Other Topics Concern    Not on file   Social History Narrative    Not on file     Social Determinants of Health     Financial Resource Strain: Not on file   Food Insecurity: Not on file   Transportation Needs: Not on file   Physical Activity: Not on file   Stress: Not on file   Social Connections: Not on file   Intimate Partner Violence: Not on file   Housing Stability: Not on file         EXAMINATION     Physical Exam:   Vitals: /72 (BP Location: Right arm, Patient Position: Sitting, BP Cuff Size: Adult)   Pulse 80   Temp 36.2 °C (97.2 °F) (Temporal)   Ht 1.676 m (5' 6\")   Wt 114 kg (250 lb 12.8 oz)   SpO2 97%     Constitutional:   Body Habitus: Body mass index is 40.48 kg/m².  Cooperation: Fully cooperates with exam  Appearance: Well-groomed, well-nourished, not disheveled     Eyes: No scleral icterus to suggest severe liver disease, no proptosis to suggest severe hyperthyroid    ENT -no obvious auditory deficits, no obvious tongue lesions, tongue midline, no facial droop     Skin -no rashes " or lesions noted     Respiratory-  breathing comfortable on room air, no audible wheezing    Cardiovascular- capillary refills less than 2 seconds.     Psychiatric- alert and oriented ×3. Normal affect.     Gait - normal gait, no use of ambulatory device, nonantalgic. .     Musculoskeletal and Neuro -           Thoracic/Lumbar Spine/Sacral Spine/Hips   Inspection: No evidence of atrophy in bilateral lower extremities throughout     ROM: full  active range of motion with flexion, lateral flexion, and rotation bilaterally.   There is full  active range of motion with lumbar extension without pain.    There is no pain with facet loading maneuver (extension rotation) with axial low back pain on the BILATERAL side(s)    Palpation:   No tenderness to palpation in midline at T1-T12 levels. No tenderness to palpation in the left and right of the midline T1-L5, NEGATIVE for tenderness to palpation to the para-midline region in the lower lumbar levels.  palpation over SI joint: negative bilaterally    palpation in hip or over the gluteus medius tendon insertion: negative bilaterally      Lumbar spine Special tests  Neuro tension  Straight leg test negative bilaterally    Slump test negative bilaterally      HIP  FAIR test negative bilaterally    Range of motion in the RIGHT hip is full  in flexion, extension, abduction, internal rotation, external rotation.  Range of motion in the LEFT hip is full  in flexion, extension, abduction, internal rotation, external rotation.      SI joint tests  Observation patient sits on one buttocks: Negative  SI joint compression negative bilaterally    SI joint distraction negative bilaterally    Thigh thrust test negative bilaterally    SUSAN test negative bilaterally                 Key points for the international standards for neurological classification of spinal cord injury (ISNCSCI) to light touch.     Dermatome R L                                      L2 2 2   L3 2 2   L4 2 2   L5 2 2  "  S1 2 2   S2 2 2       Motor Exam Lower Extremities    ? Myotome R L   Hip flexion L2 5 5   Knee extension L3 5 5   Ankle dorsiflexion L4 5 5   Toe extension L5 5 5   Ankle plantarflexion S1 5 5            MEDICAL DECISION MAKING    Medical records review: see under HPI section.     DATA    Labs:   Lab Results   Component Value Date/Time    SODIUM 139 02/14/2024 08:10 AM    POTASSIUM 3.9 02/14/2024 08:10 AM    CHLORIDE 104 02/14/2024 08:10 AM    CO2 22 02/14/2024 08:10 AM    ANION 13.0 02/14/2024 08:10 AM    GLUCOSE 94 02/14/2024 08:10 AM    BUN 9 02/14/2024 08:10 AM    CREATININE 0.59 02/14/2024 08:10 AM    CALCIUM 9.5 02/14/2024 08:10 AM    ASTSGOT 17 02/14/2024 08:10 AM    ALTSGPT 21 02/14/2024 08:10 AM    TBILIRUBIN 0.4 02/14/2024 08:10 AM    ALBUMIN 3.4 02/14/2024 08:10 AM    TOTPROTEIN 7.3 02/14/2024 08:10 AM    GLOBULIN 3.9 (H) 02/14/2024 08:10 AM    AGRATIO 0.9 02/14/2024 08:10 AM   ]    No results found for: \"PROTHROMBTM\", \"INR\"     Lab Results   Component Value Date/Time    WBC 8.7 02/14/2024 08:10 AM    RBC 5.60 (H) 02/14/2024 08:10 AM    HEMOGLOBIN 15.2 02/14/2024 08:10 AM    HEMATOCRIT 44.7 02/14/2024 08:10 AM    MCV 79.8 (L) 02/14/2024 08:10 AM    MCH 27.1 02/14/2024 08:10 AM    MCHC 34.0 02/14/2024 08:10 AM    MPV 10.0 02/14/2024 08:10 AM    NEUTSPOLYS 55.60 02/14/2024 08:10 AM    LYMPHOCYTES 35.50 02/14/2024 08:10 AM    MONOCYTES 5.40 02/14/2024 08:10 AM    EOSINOPHILS 2.40 02/14/2024 08:10 AM    BASOPHILS 0.80 02/14/2024 08:10 AM        Lab Results   Component Value Date/Time    HBA1C 4.7 02/14/2024 08:10 AM        Imaging:   I personally reviewed following images, these are my reads    MRI pelvis 4/4/2024  No evidence of acute fractures.  Visualized portion of the lumbar spine at L4-5 and L5-S1 are essentially within normal limits with no significant disc herniations or facet arthropathy.      IMAGING radiology reads. I reviewed the following radiology reads     Xray sacrum 2/13/2024  IMPRESSION: "   1.  Negative plain films sacrum and coccyx.  2.  Incidentally noted IUD.                                          Results for orders placed in visit on 04/04/24    MR-PELVIS W/O    Impression  1.  No evidence of acute fracture of the sacrum or coccyx.    2.  Anterior and left lateral angulation of the distal coccyx consistent with a normal variant appearance versus sequelae of old trauma.                             Results for orders placed during the hospital encounter of 07/21/09    DX-CHEST-2 VIEWS    Impression  IMPRESSION:    1. NO CONSOLIDATIVE PNEUMONIA IDENTIFIED.    2. MILD RIGHT CONVEX SCOLIOSIS UPPER THORACIC SPINE.  CORRELATE  CLINICALLY.      JT/llw    PRELIMINARY REPORT FAXED TO [facility] [phone], 07/24/09  AT 1013 HOURS, LLW.    Read By OZZY KERR MD on Jul 24 2009 10:04AM  : GUILHERME Transcription Date: Jul 24 2009 10:14AM  THIS DOCUMENT HAS BEEN ELECTRONICALLY SIGNED BY: OZZY KERR MD on Jul 24 2009  1:38PM                                             Diagnosis   Visit Diagnoses     ICD-10-CM   1. Coccydynia  M53.3   2. Myalgia  M79.10   3. Chronic bilateral low back pain without sciatica  M54.50    G89.29   4. BMI 40.0-44.9, adult (HCC)  Z68.41   5. Exercise counseling  Z71.82           ASSESSMENT AND PLAN:  Dian Tinsley 28 y.o. female      Dian was seen today for new patient.    Diagnoses and all orders for this visit:    Coccydynia  -     Referral to Physical Therapy  -     Referral to Massage Therapy    Myalgia  -     Referral to Massage Therapy    Chronic bilateral low back pain without sciatica  -     Referral to Massage Therapy    BMI 40.0-44.9, adult ([facility])    Exercise counseling      There are no signs of disc herniations or sacroiliac joint pathology.  No signs of fractures.    Physical therapy: I ordered physical therapy with pelvic .  Continue care with pelvic .    Diagnostic workup: Not indicated at this  time    Medications:   Continue meloxicam as needed through PCP    Interventional program: Injections not indicated     Follow-up: I am referring the patient back to her PCP and referring physician with the above recommendations          Please note that this dictation was created using voice recognition software. I have made every reasonable attempt to correct obvious errors but there may be errors of grammar and content that I may have overlooked prior to finalization of this note.      Elmer Duval MD  Physical Medicine and Rehabilitation  Interventional Spine and Sports Physiatry  [facility] Ankur Noel M.D.

## 2024-05-30 ENCOUNTER — HOSPITAL ENCOUNTER (OUTPATIENT)
Dept: RADIOLOGY | Facility: MEDICAL CENTER | Age: 29
End: 2024-05-30
Attending: OBSTETRICS & GYNECOLOGY
Payer: COMMERCIAL

## 2024-05-30 DIAGNOSIS — N93.9 HEMORRHAGE IN UTERUS: ICD-10-CM

## 2024-05-30 DIAGNOSIS — Z30.431 SURVEILLANCE OF PREVIOUSLY PRESCRIBED INTRAUTERINE CONTRACEPTIVE DEVICE: ICD-10-CM

## 2024-06-05 PROCEDURE — RXMED WILLOW AMBULATORY MEDICATION CHARGE: Performed by: OBSTETRICS & GYNECOLOGY

## 2024-06-06 ENCOUNTER — PHARMACY VISIT (OUTPATIENT)
Dept: PHARMACY | Facility: MEDICAL CENTER | Age: 29
End: 2024-06-06
Payer: COMMERCIAL

## 2024-06-18 ENCOUNTER — TELEPHONE (OUTPATIENT)
Dept: MEDICAL GROUP | Facility: LAB | Age: 29
End: 2024-06-18
Payer: COMMERCIAL

## 2024-06-18 ENCOUNTER — APPOINTMENT (OUTPATIENT)
Dept: ONCOLOGY | Facility: MEDICAL CENTER | Age: 29
End: 2024-06-18
Attending: FAMILY MEDICINE
Payer: COMMERCIAL

## 2024-06-18 NOTE — TELEPHONE ENCOUNTER
1. Caller Name: Dian Tinsley                          Call Back Number: [phone] (home)       How would the patient prefer to be contacted with a response: do not leave VM      Patient called and Reported she had a Ob appt, was taking Doxycycline, was noticing some heart concerns and had stopped the medication, still continued and she completed a heart test and had PVC flares, requesting call back with next step in advice for this.           Called back and LVM for patient to schedule visit with PCP for eval prior to referring eta.

## 2024-06-20 ENCOUNTER — OFFICE VISIT (OUTPATIENT)
Dept: MEDICAL GROUP | Facility: LAB | Age: 29
End: 2024-06-20
Payer: COMMERCIAL

## 2024-06-20 ENCOUNTER — APPOINTMENT (OUTPATIENT)
Dept: ONCOLOGY | Facility: MEDICAL CENTER | Age: 29
End: 2024-06-20
Attending: FAMILY MEDICINE
Payer: COMMERCIAL

## 2024-06-20 VITALS
HEIGHT: 66 IN | HEART RATE: 78 BPM | TEMPERATURE: 97.4 F | WEIGHT: 240 LBS | SYSTOLIC BLOOD PRESSURE: 110 MMHG | DIASTOLIC BLOOD PRESSURE: 62 MMHG | OXYGEN SATURATION: 99 % | BODY MASS INDEX: 38.57 KG/M2 | RESPIRATION RATE: 20 BRPM

## 2024-06-20 DIAGNOSIS — R00.2 PALPITATIONS: ICD-10-CM

## 2024-06-20 DIAGNOSIS — I49.3 PVC'S (PREMATURE VENTRICULAR CONTRACTIONS): ICD-10-CM

## 2024-06-20 LAB
ALBUMIN SERPL BCP-MCNC: 4.3 G/DL (ref 3.2–4.9)
ALBUMIN/GLOB SERPL: 1.5 G/DL
ALP SERPL-CCNC: 82 U/L (ref 30–99)
ALT SERPL-CCNC: 17 U/L (ref 2–50)
ANION GAP SERPL CALC-SCNC: 13 MMOL/L (ref 7–16)
AST SERPL-CCNC: 17 U/L (ref 12–45)
BILIRUB SERPL-MCNC: 0.3 MG/DL (ref 0.1–1.5)
BUN SERPL-MCNC: 9 MG/DL (ref 8–22)
CALCIUM ALBUM COR SERPL-MCNC: 9.2 MG/DL (ref 8.5–10.5)
CALCIUM SERPL-MCNC: 9.4 MG/DL (ref 8.5–10.5)
CHLORIDE SERPL-SCNC: 105 MMOL/L (ref 96–112)
CO2 SERPL-SCNC: 21 MMOL/L (ref 20–33)
CREAT SERPL-MCNC: 0.59 MG/DL (ref 0.5–1.4)
ERYTHROCYTE [DISTWIDTH] IN BLOOD BY AUTOMATED COUNT: 36.9 FL (ref 35.9–50)
GFR SERPLBLD CREATININE-BSD FMLA CKD-EPI: 125 ML/MIN/1.73 M 2
GLOBULIN SER CALC-MCNC: 2.8 G/DL (ref 1.9–3.5)
GLUCOSE SERPL-MCNC: 128 MG/DL (ref 65–99)
HCT VFR BLD AUTO: 41.3 % (ref 37–47)
HGB BLD-MCNC: 14.2 G/DL (ref 12–16)
MCH RBC QN AUTO: 27.8 PG (ref 27–33)
MCHC RBC AUTO-ENTMCNC: 34.4 G/DL (ref 32.2–35.5)
MCV RBC AUTO: 80.8 FL (ref 81.4–97.8)
PLATELET # BLD AUTO: 293 K/UL (ref 164–446)
PMV BLD AUTO: 10.2 FL (ref 9–12.9)
POTASSIUM SERPL-SCNC: 3.8 MMOL/L (ref 3.6–5.5)
PROT SERPL-MCNC: 7.1 G/DL (ref 6–8.2)
RBC # BLD AUTO: 5.11 M/UL (ref 4.2–5.4)
SODIUM SERPL-SCNC: 139 MMOL/L (ref 135–145)
TSH SERPL DL<=0.005 MIU/L-ACNC: 2.19 UIU/ML (ref 0.38–5.33)
WBC # BLD AUTO: 9.9 K/UL (ref 4.8–10.8)

## 2024-06-20 PROCEDURE — 3074F SYST BP LT 130 MM HG: CPT | Performed by: FAMILY MEDICINE

## 2024-06-20 PROCEDURE — 3078F DIAST BP <80 MM HG: CPT | Performed by: FAMILY MEDICINE

## 2024-06-20 PROCEDURE — 84443 ASSAY THYROID STIM HORMONE: CPT

## 2024-06-20 PROCEDURE — 99214 OFFICE O/P EST MOD 30 MIN: CPT | Mod: 25 | Performed by: FAMILY MEDICINE

## 2024-06-20 PROCEDURE — 80053 COMPREHEN METABOLIC PANEL: CPT

## 2024-06-20 PROCEDURE — 85027 COMPLETE CBC AUTOMATED: CPT

## 2024-06-20 ASSESSMENT — FIBROSIS 4 INDEX: FIB4 SCORE: 0.34

## 2024-06-20 NOTE — PROGRESS NOTES
"Subjective:     CC: Palpitations    HPI:   Dian presents today with concern for palpitations.  This began about 2 weeks ago after she started a doxycycline course from her OB/GYN.  She noticed frequent palpitations, notable a feeling like a pause as well as palpated an irregular pulse.  She stopped doxycycline with intermittent improvement but palpitations did return.  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  She has been working on pushing hydration.  Very minimal alcohol, no caffeine.  Some mild stress but no worse than normal.  She is sleeping well.  Otherwise without medication changes.  She does take nondrowsy antihistamine twice daily. She has not had any significant or persistent chest pain with this, no persistent shortness of breath.    Medications, past medical history, allergies, and social history have been reviewed and updated.      Objective:       Exam:  /62 (BP Location: Left arm, Patient Position: Sitting, BP Cuff Size: Adult)   Pulse 78   Temp 36.3 °C (97.4 °F) (Temporal)   Resp 20   Ht 1.676 m (5' 6\")   Wt 109 kg (240 lb)   SpO2 99%   BMI 38.74 kg/m²  Body mass index is 38.74 kg/m².    Constitutional: Alert. Well appearing. No distress.  Skin: Warm, dry, good turgor, no visible rashes.  ENMT: Moist mucous membranes. Normal dentition.  Respiratory: Normal effort. Lungs are clear to auscultation bilaterally.  Cardiovascular: Regular rate and rhythm. Normal S1/S2. No murmurs, rubs or gallops.   Neuro: Moves all four extremities. No facial droop.  Psych: Answers questions appropriately. Normal affect and mood.    EKG interpretation by me: NSR with PVC.  HR is 80Axis is normal. No ST or QRS morphologic changes. No T-wave inversions. WV and QT intervals are normal. Quality of EKG is good.        Assessment & Plan:     28 y.o. female with the following -     1. Palpitations  2. PVC's (premature ventricular contractions)  Frequent palpitations " over the last 2 weeks likely secondary to PVCs.  PVC is noted on EKG.  Will check labs as below, discussed avoiding potential triggers.  Holter for further evaluation of PVC burden.  Discussed possibility of beta-blocker but holding for now, did discuss that we can start this if she continues to have frequent palpitations and she will message us.  Consider echo if high burden on Holter monitor.  - EKG - Clinic Performed  - CBC WITHOUT DIFFERENTIAL; Future  - Comp Metabolic Panel; Future  - TSH WITH REFLEX TO FT4; Future  - HOLTER - Cardiology Performed (48HR); Future      Please note that this note was created using voice recognition software.

## 2024-06-21 ENCOUNTER — APPOINTMENT (OUTPATIENT)
Dept: MEDICAL GROUP | Facility: LAB | Age: 29
End: 2024-06-21
Payer: COMMERCIAL

## 2024-07-01 ENCOUNTER — NON-PROVIDER VISIT (OUTPATIENT)
Dept: CARDIOLOGY | Facility: MEDICAL CENTER | Age: 29
End: 2024-07-01
Attending: FAMILY MEDICINE
Payer: COMMERCIAL

## 2024-07-01 DIAGNOSIS — I49.3 PVC'S (PREMATURE VENTRICULAR CONTRACTIONS): ICD-10-CM

## 2024-07-01 DIAGNOSIS — R00.2 PALPITATIONS: ICD-10-CM

## 2024-07-01 PROCEDURE — 93225 XTRNL ECG REC<48 HRS REC: CPT

## 2024-07-01 PROCEDURE — 93227 XTRNL ECG REC<48 HR R&I: CPT

## 2024-07-01 PROCEDURE — 93226 XTRNL ECG REC<48 HR SCAN A/R: CPT

## 2024-07-11 ENCOUNTER — PHARMACY VISIT (OUTPATIENT)
Dept: PHARMACY | Facility: MEDICAL CENTER | Age: 29
End: 2024-07-11
Payer: COMMERCIAL

## 2024-07-11 PROCEDURE — RXMED WILLOW AMBULATORY MEDICATION CHARGE: Performed by: INTERNAL MEDICINE

## 2024-07-15 ENCOUNTER — TELEPHONE (OUTPATIENT)
Dept: CARDIOLOGY | Facility: MEDICAL CENTER | Age: 29
End: 2024-07-15
Payer: COMMERCIAL

## 2024-08-08 NOTE — PROGRESS NOTES
Patient to [facility] for lab draw. Labs drawn by venipuncture in right arm. Coban and gauze for a dressing. Patient to home.

## 2024-08-27 ENCOUNTER — OFFICE VISIT (OUTPATIENT)
Dept: MEDICAL GROUP | Facility: LAB | Age: 29
End: 2024-08-27
Payer: COMMERCIAL

## 2024-08-27 VITALS
SYSTOLIC BLOOD PRESSURE: 120 MMHG | WEIGHT: 255.8 LBS | HEART RATE: 74 BPM | TEMPERATURE: 97.9 F | OXYGEN SATURATION: 98 % | DIASTOLIC BLOOD PRESSURE: 70 MMHG | RESPIRATION RATE: 18 BRPM | HEIGHT: 66 IN | BODY MASS INDEX: 41.11 KG/M2

## 2024-08-27 DIAGNOSIS — N62 LARGE BREASTS: ICD-10-CM

## 2024-08-27 PROBLEM — T39.311A: Status: RESOLVED | Noted: 2019-04-21 | Resolved: 2024-08-27

## 2024-08-27 PROBLEM — T39.1X1A ACCIDENTAL ACETAMINOPHEN OVERDOSE: Status: RESOLVED | Noted: 2019-04-21 | Resolved: 2024-08-27

## 2024-08-27 PROCEDURE — 3074F SYST BP LT 130 MM HG: CPT | Performed by: FAMILY MEDICINE

## 2024-08-27 PROCEDURE — 99213 OFFICE O/P EST LOW 20 MIN: CPT | Performed by: FAMILY MEDICINE

## 2024-08-27 PROCEDURE — 3078F DIAST BP <80 MM HG: CPT | Performed by: FAMILY MEDICINE

## 2024-08-27 ASSESSMENT — FIBROSIS 4 INDEX: FIB4 SCORE: 0.41

## 2024-08-27 NOTE — PROGRESS NOTES
"Subjective:     CC: Referral    HPI:   Dian presents today discuss referral to plastic surgery for breast reduction.  She has been considering this for years due to years of symptoms including chronic back, neck, and shoulder pain.  She gets rashes on shoulders from bra and also has permanent indents from bra straps.  Exercises options have also been limited secondary to breast size and pain.    Medications, past medical history, allergies, and social history have been reviewed and updated.      Objective:       Exam:  /70 (BP Location: Left arm, Patient Position: Sitting, BP Cuff Size: Adult)   Pulse 74   Temp 36.6 °C (97.9 °F) (Temporal)   Resp 18   Ht 1.676 m (5' 6\")   Wt 116 kg (255 lb 12.8 oz)   SpO2 98%   BMI 41.29 kg/m²  Body mass index is 41.29 kg/m².    Constitutional: Alert. Well appearing. No distress.  Skin: Warm, dry, good turgor, no visible rashes.  ENMT: Moist mucous membranes.   Respiratory: Normal effort.  Neuro: Moves all four extremities. No facial droop.  Psych: Answers questions appropriately. Normal affect and mood.    Assessment & Plan:     29 y.o. female with the following -     1. Large breasts  Large breasts with multiple chronic symptoms including shoulder pain, neck pain, back pain, skin changes, and limited ability to exercise.  I do think she would benefit from breast reduction surgery and referral is placed to plastic surgery.  - Referral to Plastic Surgery        Please note that this note was created using voice recognition software.      " The patient is a 61y Female complaining of abdominal pain.

## 2024-10-16 ENCOUNTER — IMMUNIZATION (OUTPATIENT)
Dept: OCCUPATIONAL MEDICINE | Facility: CLINIC | Age: 29
End: 2024-10-16

## 2024-10-16 DIAGNOSIS — Z23 NEED FOR VACCINATION: Primary | ICD-10-CM

## 2024-10-16 PROCEDURE — 90656 IIV3 VACC NO PRSV 0.5 ML IM: CPT | Performed by: PREVENTIVE MEDICINE

## 2024-11-06 ENCOUNTER — EH NON-PROVIDER (OUTPATIENT)
Dept: OCCUPATIONAL MEDICINE | Facility: CLINIC | Age: 29
End: 2024-11-06

## 2024-11-06 DIAGNOSIS — Z02.89 VISIT FOR OCCUPATIONAL HEALTH EXAMINATION: ICD-10-CM

## 2024-11-06 PROCEDURE — 94010 BREATHING CAPACITY TEST: CPT | Performed by: NURSE PRACTITIONER

## 2024-11-06 PROCEDURE — 94375 RESPIRATORY FLOW VOLUME LOOP: CPT | Performed by: NURSE PRACTITIONER

## 2024-12-04 PROCEDURE — RXMED WILLOW AMBULATORY MEDICATION CHARGE: Performed by: INTERNAL MEDICINE

## 2024-12-10 ENCOUNTER — PHARMACY VISIT (OUTPATIENT)
Dept: PHARMACY | Facility: MEDICAL CENTER | Age: 29
End: 2024-12-10
Payer: COMMERCIAL

## 2025-03-07 ENCOUNTER — PHARMACY VISIT (OUTPATIENT)
Dept: PHARMACY | Facility: MEDICAL CENTER | Age: 30
End: 2025-03-07
Payer: COMMERCIAL

## 2025-03-07 PROCEDURE — RXMED WILLOW AMBULATORY MEDICATION CHARGE: Performed by: INTERNAL MEDICINE

## 2025-05-20 ENCOUNTER — PHARMACY VISIT (OUTPATIENT)
Dept: PHARMACY | Facility: MEDICAL CENTER | Age: 30
End: 2025-05-20
Payer: COMMERCIAL

## 2025-05-20 ENCOUNTER — OFFICE VISIT (OUTPATIENT)
Dept: MEDICAL GROUP | Facility: LAB | Age: 30
End: 2025-05-20
Payer: COMMERCIAL

## 2025-05-20 VITALS
TEMPERATURE: 97.6 F | SYSTOLIC BLOOD PRESSURE: 110 MMHG | DIASTOLIC BLOOD PRESSURE: 82 MMHG | OXYGEN SATURATION: 95 % | HEART RATE: 80 BPM | RESPIRATION RATE: 16 BRPM | HEIGHT: 66 IN | BODY MASS INDEX: 35.84 KG/M2 | WEIGHT: 223 LBS

## 2025-05-20 DIAGNOSIS — L03.019 PARONYCHIA OF MIDDLE FINGER: Primary | ICD-10-CM

## 2025-05-20 PROCEDURE — 3079F DIAST BP 80-89 MM HG: CPT | Performed by: FAMILY MEDICINE

## 2025-05-20 PROCEDURE — 3074F SYST BP LT 130 MM HG: CPT | Performed by: FAMILY MEDICINE

## 2025-05-20 PROCEDURE — RXMED WILLOW AMBULATORY MEDICATION CHARGE: Performed by: FAMILY MEDICINE

## 2025-05-20 PROCEDURE — 99214 OFFICE O/P EST MOD 30 MIN: CPT | Performed by: FAMILY MEDICINE

## 2025-05-20 RX ORDER — MONTELUKAST SODIUM 10 MG/1
TABLET ORAL
Qty: 90 TABLET | Refills: 1 | Status: CANCELLED | OUTPATIENT
Start: 2025-05-20

## 2025-05-20 ASSESSMENT — FIBROSIS 4 INDEX: FIB4 SCORE: 0.41

## 2025-05-20 NOTE — PROGRESS NOTES
"Subjective:     CC: Finger infection    HPI:   Dian presents today with concern for infection surrounding the nailbed of the right middle finger.  This has been present for over 2 weeks and has not resolved with warm soaks and topical antibiotics.  She also has some concern about recent exposures when traveling and using pool and Jacuzzi at a hotel.    Current Medications[1]    Medications, past medical history, allergies, and social history have been reviewed and updated.      Objective:       Exam:  /82 (BP Location: Left arm, Patient Position: Sitting, BP Cuff Size: Adult)   Pulse 80   Temp 36.4 °C (97.6 °F) (Temporal)   Resp 16   Ht 1.676 m (5' 6\")   Wt 101 kg (223 lb)   SpO2 95%   BMI 35.99 kg/m²  Body mass index is 35.99 kg/m².    Constitutional: Alert. Well appearing. No distress.  Skin: There is a small area of erythema and swelling just proximal to the nailbed of the right middle finger.  No definitive fluid collection or abscess.  No purulent drainage.  Psych: Answers questions appropriately. Normal affect and mood.    Assessment & Plan:     29 y.o. female with the following -     1. Paronychia of middle finger (Primary)  Paronychia of the right middle finger persisting for weeks and not completely resolving with warm soaks and topical antibiotics.  Will do course of oral antibiotics.  She has allergies/sensitivities to both doxycycline and Bactrim.  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  She will let us know if not improving and we can switch course to cover MRSA if needed.  - amoxicillin-clavulanate (AUGMENTIN) 875-125 MG Tab; Take 1 Tablet by mouth 2 times a day for 5 days.  Dispense: 10 Tablet; Refill: 0    Please note that this note was created using voice recognition software.           [1]   Current Outpatient Medications   Medication Sig Dispense Refill    EPINEPHrine (EPIPEN) 0.3 MG/0.3ML " Solution Auto-injector solution for injection Use as directed. Injection in upper outer thigh and hold in place for 5 seconds and call 911. 2 Each 1    montelukast (SINGULAIR) 10 MG Tab Take 1 tablet orally nightly before bed. 90 Tablet 1    montelukast (SINGULAIR) 10 MG Tab Take 1 tablet orally nightly before bed. 90 Tablet 1    famotidine (PEPCID) 20 MG Tab Take 20 mg by mouth every day.      fexofenadine (ALLEGRA ALLERGY) 60 MG Tab Take 60 mg by mouth every day.      EPINEPHrine (EPIPEN) 0.3 MG/0.3ML Solution Auto-injector solution for injection Use as directed. Injection in upper outer thigh and hold in place for 5 seconds and get medical emergency help immediately. 2 Each 1    albuterol 108 (90 Base) MCG/ACT Aero Soln inhalation aerosol Inhale 2 puffs inhaled every 4-6 hrs as  needed and 2 puffs 15 min prior to physical exertion. 8.5 g 1    azelastine (ASTELIN) 137 MCG/SPRAY nasal spray instill 2 sprays into each nostril twice a day 90 mL 1    pimecrolimus (ELIDEL) 1 % cream Apply sparingly to affected area 2 times a day as needed for itching. 60 g 2     No current facility-administered medications for this visit.